# Patient Record
Sex: FEMALE | Race: WHITE | NOT HISPANIC OR LATINO
[De-identification: names, ages, dates, MRNs, and addresses within clinical notes are randomized per-mention and may not be internally consistent; named-entity substitution may affect disease eponyms.]

---

## 2019-04-12 PROBLEM — Z00.00 ENCOUNTER FOR PREVENTIVE HEALTH EXAMINATION: Status: ACTIVE | Noted: 2019-04-12

## 2019-05-27 ENCOUNTER — TRANSCRIPTION ENCOUNTER (OUTPATIENT)
Age: 79
End: 2019-05-27

## 2019-08-07 ENCOUNTER — INPATIENT (INPATIENT)
Facility: HOSPITAL | Age: 79
LOS: 5 days | Discharge: NURSING, REHAB OR LONG-TERM CARE | End: 2019-08-13
Attending: ORTHOPAEDIC SURGERY | Admitting: ORTHOPAEDIC SURGERY
Payer: MEDICARE

## 2019-08-07 VITALS
SYSTOLIC BLOOD PRESSURE: 188 MMHG | HEART RATE: 67 BPM | TEMPERATURE: 98 F | OXYGEN SATURATION: 94 % | RESPIRATION RATE: 18 BRPM | DIASTOLIC BLOOD PRESSURE: 84 MMHG

## 2019-08-07 DIAGNOSIS — Z98.890 OTHER SPECIFIED POSTPROCEDURAL STATES: Chronic | ICD-10-CM

## 2019-08-07 LAB
ALBUMIN SERPL ELPH-MCNC: 4 G/DL — SIGNIFICANT CHANGE UP (ref 3.5–5.2)
ALP SERPL-CCNC: 59 U/L — SIGNIFICANT CHANGE UP (ref 30–115)
ALT FLD-CCNC: 12 U/L — SIGNIFICANT CHANGE UP (ref 0–41)
ANION GAP SERPL CALC-SCNC: 13 MMOL/L — SIGNIFICANT CHANGE UP (ref 7–14)
APTT BLD: 25.5 SEC — LOW (ref 27–39.2)
AST SERPL-CCNC: 18 U/L — SIGNIFICANT CHANGE UP (ref 0–41)
BASOPHILS # BLD AUTO: 0.04 K/UL — SIGNIFICANT CHANGE UP (ref 0–0.2)
BASOPHILS NFR BLD AUTO: 0.4 % — SIGNIFICANT CHANGE UP (ref 0–1)
BILIRUB SERPL-MCNC: 0.3 MG/DL — SIGNIFICANT CHANGE UP (ref 0.2–1.2)
BUN SERPL-MCNC: 28 MG/DL — HIGH (ref 10–20)
CALCIUM SERPL-MCNC: 9.5 MG/DL — SIGNIFICANT CHANGE UP (ref 8.5–10.1)
CHLORIDE SERPL-SCNC: 103 MMOL/L — SIGNIFICANT CHANGE UP (ref 98–110)
CO2 SERPL-SCNC: 24 MMOL/L — SIGNIFICANT CHANGE UP (ref 17–32)
CREAT SERPL-MCNC: 0.9 MG/DL — SIGNIFICANT CHANGE UP (ref 0.7–1.5)
EOSINOPHIL # BLD AUTO: 0.23 K/UL — SIGNIFICANT CHANGE UP (ref 0–0.7)
EOSINOPHIL NFR BLD AUTO: 2.1 % — SIGNIFICANT CHANGE UP (ref 0–8)
GLUCOSE SERPL-MCNC: 105 MG/DL — HIGH (ref 70–99)
HCT VFR BLD CALC: 39.9 % — SIGNIFICANT CHANGE UP (ref 37–47)
HGB BLD-MCNC: 13.6 G/DL — SIGNIFICANT CHANGE UP (ref 12–16)
IMM GRANULOCYTES NFR BLD AUTO: 0.5 % — HIGH (ref 0.1–0.3)
INR BLD: 0.88 RATIO — SIGNIFICANT CHANGE UP (ref 0.65–1.3)
LYMPHOCYTES # BLD AUTO: 2.41 K/UL — SIGNIFICANT CHANGE UP (ref 1.2–3.4)
LYMPHOCYTES # BLD AUTO: 21.5 % — SIGNIFICANT CHANGE UP (ref 20.5–51.1)
MCHC RBC-ENTMCNC: 30.6 PG — SIGNIFICANT CHANGE UP (ref 27–31)
MCHC RBC-ENTMCNC: 34.1 G/DL — SIGNIFICANT CHANGE UP (ref 32–37)
MCV RBC AUTO: 89.9 FL — SIGNIFICANT CHANGE UP (ref 81–99)
MONOCYTES # BLD AUTO: 0.85 K/UL — HIGH (ref 0.1–0.6)
MONOCYTES NFR BLD AUTO: 7.6 % — SIGNIFICANT CHANGE UP (ref 1.7–9.3)
NEUTROPHILS # BLD AUTO: 7.6 K/UL — HIGH (ref 1.4–6.5)
NEUTROPHILS NFR BLD AUTO: 67.9 % — SIGNIFICANT CHANGE UP (ref 42.2–75.2)
NRBC # BLD: 0 /100 WBCS — SIGNIFICANT CHANGE UP (ref 0–0)
PLATELET # BLD AUTO: 242 K/UL — SIGNIFICANT CHANGE UP (ref 130–400)
POTASSIUM SERPL-MCNC: 4.7 MMOL/L — SIGNIFICANT CHANGE UP (ref 3.5–5)
POTASSIUM SERPL-SCNC: 4.7 MMOL/L — SIGNIFICANT CHANGE UP (ref 3.5–5)
PROT SERPL-MCNC: 6.8 G/DL — SIGNIFICANT CHANGE UP (ref 6–8)
PROTHROM AB SERPL-ACNC: 10.1 SEC — SIGNIFICANT CHANGE UP (ref 9.95–12.87)
RBC # BLD: 4.44 M/UL — SIGNIFICANT CHANGE UP (ref 4.2–5.4)
RBC # FLD: 12.4 % — SIGNIFICANT CHANGE UP (ref 11.5–14.5)
SODIUM SERPL-SCNC: 140 MMOL/L — SIGNIFICANT CHANGE UP (ref 135–146)
WBC # BLD: 11.19 K/UL — HIGH (ref 4.8–10.8)
WBC # FLD AUTO: 11.19 K/UL — HIGH (ref 4.8–10.8)

## 2019-08-07 PROCEDURE — 73502 X-RAY EXAM HIP UNI 2-3 VIEWS: CPT | Mod: 26,RT

## 2019-08-07 PROCEDURE — 73552 X-RAY EXAM OF FEMUR 2/>: CPT | Mod: 26,RT

## 2019-08-07 PROCEDURE — 99223 1ST HOSP IP/OBS HIGH 75: CPT

## 2019-08-07 PROCEDURE — 71045 X-RAY EXAM CHEST 1 VIEW: CPT | Mod: 26

## 2019-08-07 PROCEDURE — 12001 RPR S/N/AX/GEN/TRNK 2.5CM/<: CPT

## 2019-08-07 PROCEDURE — 71260 CT THORAX DX C+: CPT | Mod: 26

## 2019-08-07 PROCEDURE — 99222 1ST HOSP IP/OBS MODERATE 55: CPT

## 2019-08-07 PROCEDURE — 72125 CT NECK SPINE W/O DYE: CPT | Mod: 26

## 2019-08-07 PROCEDURE — 93010 ELECTROCARDIOGRAM REPORT: CPT

## 2019-08-07 PROCEDURE — 73560 X-RAY EXAM OF KNEE 1 OR 2: CPT | Mod: 26,RT

## 2019-08-07 PROCEDURE — 70450 CT HEAD/BRAIN W/O DYE: CPT | Mod: 26

## 2019-08-07 PROCEDURE — 99285 EMERGENCY DEPT VISIT HI MDM: CPT | Mod: 25

## 2019-08-07 PROCEDURE — 74177 CT ABD & PELVIS W/CONTRAST: CPT | Mod: 26

## 2019-08-07 RX ORDER — HYDROCHLOROTHIAZIDE 25 MG
25 TABLET ORAL DAILY
Refills: 0 | Status: DISCONTINUED | OUTPATIENT
Start: 2019-08-07 | End: 2019-08-09

## 2019-08-07 RX ORDER — ACETAMINOPHEN 500 MG
650 TABLET ORAL EVERY 6 HOURS
Refills: 0 | Status: DISCONTINUED | OUTPATIENT
Start: 2019-08-07 | End: 2019-08-09

## 2019-08-07 RX ORDER — ATORVASTATIN CALCIUM 80 MG/1
40 TABLET, FILM COATED ORAL AT BEDTIME
Refills: 0 | Status: DISCONTINUED | OUTPATIENT
Start: 2019-08-07 | End: 2019-08-09

## 2019-08-07 RX ORDER — MORPHINE SULFATE 50 MG/1
4 CAPSULE, EXTENDED RELEASE ORAL ONCE
Refills: 0 | Status: DISCONTINUED | OUTPATIENT
Start: 2019-08-07 | End: 2019-08-07

## 2019-08-07 RX ORDER — ONDANSETRON 8 MG/1
4 TABLET, FILM COATED ORAL EVERY 6 HOURS
Refills: 0 | Status: DISCONTINUED | OUTPATIENT
Start: 2019-08-07 | End: 2019-08-09

## 2019-08-07 RX ORDER — HEPARIN SODIUM 5000 [USP'U]/ML
5000 INJECTION INTRAVENOUS; SUBCUTANEOUS EVERY 8 HOURS
Refills: 0 | Status: DISCONTINUED | OUTPATIENT
Start: 2019-08-07 | End: 2019-08-09

## 2019-08-07 RX ORDER — LISINOPRIL 2.5 MG/1
1 TABLET ORAL
Qty: 0 | Refills: 0 | DISCHARGE

## 2019-08-07 RX ORDER — ASPIRIN/CALCIUM CARB/MAGNESIUM 324 MG
81 TABLET ORAL DAILY
Refills: 0 | Status: DISCONTINUED | OUTPATIENT
Start: 2019-08-07 | End: 2019-08-09

## 2019-08-07 RX ORDER — ROSUVASTATIN CALCIUM 5 MG/1
1 TABLET ORAL
Qty: 0 | Refills: 0 | DISCHARGE

## 2019-08-07 RX ORDER — MORPHINE SULFATE 50 MG/1
2 CAPSULE, EXTENDED RELEASE ORAL EVERY 6 HOURS
Refills: 0 | Status: DISCONTINUED | OUTPATIENT
Start: 2019-08-07 | End: 2019-08-09

## 2019-08-07 RX ORDER — MORPHINE SULFATE 50 MG/1
2 CAPSULE, EXTENDED RELEASE ORAL ONCE
Refills: 0 | Status: DISCONTINUED | OUTPATIENT
Start: 2019-08-07 | End: 2019-08-07

## 2019-08-07 RX ORDER — CHOLECALCIFEROL (VITAMIN D3) 125 MCG
1000 CAPSULE ORAL DAILY
Refills: 0 | Status: DISCONTINUED | OUTPATIENT
Start: 2019-08-07 | End: 2019-08-09

## 2019-08-07 RX ORDER — SODIUM CHLORIDE 9 MG/ML
1000 INJECTION INTRAMUSCULAR; INTRAVENOUS; SUBCUTANEOUS ONCE
Refills: 0 | Status: COMPLETED | OUTPATIENT
Start: 2019-08-07 | End: 2019-08-07

## 2019-08-07 RX ORDER — LISINOPRIL 2.5 MG/1
40 TABLET ORAL DAILY
Refills: 0 | Status: DISCONTINUED | OUTPATIENT
Start: 2019-08-07 | End: 2019-08-09

## 2019-08-07 RX ORDER — PREGABALIN 225 MG/1
1000 CAPSULE ORAL DAILY
Refills: 0 | Status: DISCONTINUED | OUTPATIENT
Start: 2019-08-07 | End: 2019-08-09

## 2019-08-07 RX ORDER — OMEGA-3 ACID ETHYL ESTERS 1 G
1 CAPSULE ORAL
Qty: 0 | Refills: 0 | DISCHARGE

## 2019-08-07 RX ADMIN — SODIUM CHLORIDE 1000 MILLILITER(S): 9 INJECTION INTRAMUSCULAR; INTRAVENOUS; SUBCUTANEOUS at 19:41

## 2019-08-07 RX ADMIN — MORPHINE SULFATE 4 MILLIGRAM(S): 50 CAPSULE, EXTENDED RELEASE ORAL at 19:41

## 2019-08-07 NOTE — CONSULT NOTE ADULT - SUBJECTIVE AND OBJECTIVE BOX
ORTHOPAEDIC SURGERY CONSULT    79F with hx of R TEE in 2010 by our group in [facility]. Sustained a ground level fall today while fixing her TV which led to immediate pain and swelling to her R thigh. Denies pain anywhere else. Denies anteceding pain in that anatomic location.    PAST MEDICAL & SURGICAL HISTORY:  High cholesterol  HTN (hypertension)    Allergies    No Known Allergies    Home Medications:  Per chart      PE:    RLE  Comfortable in bed, NAD  No obvious deformity around the thigh  Knee in slight external rotation  No skin breaks or tenting  Motor exam limited 2/2 fracture but EHL/FHL/TA intact  SILT distally  WWP        XR: R Periprosthetic femur fracture, TEE in place, stem appears to be well fixed.        A/P: 79F as above  - Please obtain CT R full femur   - NWB RLE  - Patient to be admitted to Trauma vs. Medicine.   - Discussed with Dr. Gillian Coto, possible OR this week. Will confirm after CT.

## 2019-08-07 NOTE — H&P ADULT - HISTORY OF PRESENT ILLNESS
79 year old female with PMH HTN, HLD, and PSH of right hip replacement presents with fall with right hip pain, positve head trauma with laceration, negative LOC, on daily baby aspirin. Per ED, patient states she was kneeling on floor doing something with her TV and leaned backwards and lost her balance hitting head on coffee table. Patient denies any dizziness, N/V or visual complaints. Patient states she dragged her body to phone because she was unable to ambulate due to pain at hip. No CP, SOB, palpitations, neck, back or abdominal pain. No focal weakness or paresthesias. 79 year old female with PMH HTN, HLD, and PSH of right hip replacement presents s/p fall fall with right thigh pain, +ve HT on ASA with posterior laceration, negative LOC, Patient states she was kneeling on floor doing and leaned backwards and lost her balance hitting head on coffee table. Patient denies any dizziness, N/V or visual complaints. Patient states she dragged her body to phone because she was unable to ambulate due to pain at hip. No CP, SOB, palpitations, neck, back or abdominal pain. No focal weakness or paresthesias.

## 2019-08-07 NOTE — ED PROVIDER NOTE - OBJECTIVE STATEMENT
78 yo female with PMH HTN, HLD, hx right hip replacement presents c/p fall with right hip pain and laceration to head. Pt states she was kneeling on floor doing something with her TV and leaned backwards and ost her balance hitting head on coffee table. Pt denies any LOC, HA, dizziness, N/V or visual complaints. Pt states she dragged her body to phone because she was unable to ambulate due to pain at hip. No CP, SOB, palpitations, neck, back or abdominal pain. No focal weakness or paresthesias. Pt on 81 mg aspirin daily.

## 2019-08-07 NOTE — ED ADULT NURSE NOTE - OBJECTIVE STATEMENT
Patient presents to ER s/p fall in her living room, pt states she was bending down and fell backwards, hit her head on coffee table, denies any loc, denies use of blood thinners, +laceration to occipital head, bleeding controlled at this time, pt complains of right thigh pain, long board applied by ems. pt denies any pain at this time, increased pain upon movement. Pt alert and orientedx3, iv inserted. Safety maintained and hourly rounding performed. Will continue with plan of care.

## 2019-08-07 NOTE — H&P ADULT - NSHPLABSRESULTS_GEN_ALL_CORE
< from: Xray Hip 2-3 Views, Right (08.07.19 @ 18:55) >    Findings/  IMPRESSION:    Status post total right hip arthroplasty with acetabular screw fixation.    No evidence of acute fracture or dislocation.     Degenerative changes to the left hip joints and visible portions of the   lumbar spine.    Vascular calcifications.          ***Please see the addendum at the top of this report. It may contain   additional important information or changes.****      AKASH FITZGERALD M.D., RESIDENT RADIOLOGIST    < from: CT Head No Cont (08.07.19 @ 19:12) >    IMPRESSION:   1.  No evidence of acute intracranial hemorrhage, midline shift or mass   effect.  2.  Partial opacification of the right maxillary sinus with an air-fluid   level, consistent with sinusitis versus hemorrhage.              RAHAT ALEXIS M.D., RESIDENT RADIOLOGIST  This document has been electronically signed.  CLARE YO M.D., ATTENDING RADIOLOGIST  This document has been electronically signed. Aug  7 2019  8:22PM      This document has been electronically signed.  CLARE YO M.D., ATTENDING RADIOLOGIST  This document has been electronically signed. Aug  7 2019  8:24PM  Addend:  CLARE YO M.D., ATTENDING RADIOLOGIST  This addendum was electronically signed on: Aug  7 2019  9:10PM.        < from: CT Abdomen and Pelvis w/ IV Cont (08.07.19 @ 19:24) >      IMPRESSION:    No CT evidence of acute trauma thoracic or abdominal pelvic traumatic   injury.    Cholelithiasis and sludge.              AKASH FITZGERALD M.D., RESIDENT RADIOLOGIST  This document has been electronically signed.  CLARE YO M.D., ATTENDING RADIOLOGIST  This document has been electronically signed. Aug  7 2019  8:13PM        < end of copied text > Labs:  CAPILLARY BLOOD GLUCOSE                              13.6   11.19 )-----------( 242      ( 07 Aug 2019 17:40 )             39.9       Auto Neutrophil %: 67.9 % (08-07-19 @ 17:40)  Auto Immature Granulocyte %: 0.5 % (08-07-19 @ 17:40)    08-07    140  |  103  |  28<H>  ----------------------------<  105<H>  4.7   |  24  |  0.9      Calcium, Total Serum: 9.5 mg/dL (08-07-19 @ 17:40)      LFTs:             6.8  | 0.3  | 18       ------------------[59      ( 07 Aug 2019 17:40 )  4.0  | x    | 12          Lipase:x      Amylase:x             Coags:     10.10  ----< 0.88    ( 07 Aug 2019 17:40 )     25.5        < from: CT Abdomen and Pelvis w/ IV Cont (08.07.19 @ 19:24) >    No CT evidence of acute trauma thoracic or abdominal pelvic traumatic   injury.    Cholelithiasis and sludge.    < end of copied text >    < from: CT Cervical Spine No Cont (08.07.19 @ 19:13) >    No acute osseous abnormality    Multilevel moderatedegenerative disc disease    < end of copied text >    < from: CT Head No Cont (08.07.19 @ 19:12) >    1.  No evidence of acute intracranial hemorrhage, midline shift or mass   effect.  2.  Partial opacification of the right maxillary sinus with an air-fluid   level, consistent with sinusitis versus hemorrhage.    < end of copied text >    < from: Xray Hip 2-3 Views, Right (08.07.19 @ 18:55) >      Correction: Patient status post right total hip replacement with   displaced periprosthetic fracture at the tip of the femoral stem.   Surrounding soft tissue swelling is consistent with hematoma.    < end of copied text >

## 2019-08-07 NOTE — ED PROVIDER NOTE - CARE PLAN
Principal Discharge DX:	Closed fracture of right femur, unspecified fracture morphology, unspecified portion of femur, initial encounter  Secondary Diagnosis:	Scalp laceration  Secondary Diagnosis:	Fall

## 2019-08-07 NOTE — H&P ADULT - ATTENDING COMMENTS
mid femor shaft fracture   for OR with ortho     pain control   cleared from trauma to undergo femor repair

## 2019-08-07 NOTE — ED PROVIDER NOTE - CLINICAL SUMMARY MEDICAL DECISION MAKING FREE TEXT BOX
pt s/p fall, diagnosed with right femur fracture, pan scan without additional acute injuries, seen by trauma, ortho consulted, requested additional CT LE right and trauma requesting admission to Dr. Jung service

## 2019-08-07 NOTE — H&P ADULT - NSHPPHYSICALEXAM_GEN_ALL_CORE
T(C): 36.6 (07 Aug 2019 16:10), Max: 36.6 (07 Aug 2019 16:10)  T(F): 97.9 (07 Aug 2019 16:10), Max: 97.9 (07 Aug 2019 16:10)  HR: 68 (07 Aug 2019 17:20) (67 - 68)  BP: 181/78 (07 Aug 2019 17:20) (181/78 - 188/84)  RR: 18 (07 Aug 2019 17:20) (18 - 18)  SpO2: 98% (07 Aug 2019 17:20) (94% - 98%)    PHYSICAL EXAM:  GENERAL: NAD, well-appearing  CHEST/LUNG: Clear to auscultation bilaterally  HEART: Regular rate and rhythm  ABDOMEN: Soft, Nontender, Nondistended;   EXTREMITIES:  tenderness right thigh, no ecchymoses, unable to range due to pain, shortened and externally rotated, no ankle tenderness or calf tenderness

## 2019-08-07 NOTE — H&P ADULT - ASSESSMENT
Assessment:  79 year old female who presented with fall with hip pain and head trauma.     Plan:  - pain control as needed  - recs as per ortho  - f/u CT R full femur  - possible OR  - keep NPO Assessment:  79 year old female who presented with fall with hip pain and head trauma found to have right femur fx    Plan:  - Admit to surgical service  - pain control as needed  - F/u ortho recs s/p CT R full femur  - possible OR later this week (friday preliminary)  - Regular Diet  - PT/PMR consult  - Hospitalist consult  - Resume home medications    Plan discussed with Dr. Jung

## 2019-08-07 NOTE — ED PROVIDER NOTE - PHYSICAL EXAMINATION
VITAL SIGNS: noted  CONSTITUTIONAL: Well-developed; well-nourished; in no acute distress  HEAD: Normocephalic; 1 cm superficial laceration to posterior occiput, no active bleeding  EYES: PERRL, EOM intact; conjunctiva and sclera clear  ENT: No nasal discharge; airway clear. MMM  NECK: Supple; non tender. No anterior cervical lymphadenopathy noted  CARD: S1, S2 normal; no murmurs, gallops, or rubs. Regular rate and rhythm  RESP: CTAB/L, no wheezes, rales or rhonchi  ABD: Normal bowel sounds; soft; non-distended; non-tender; no hepatosplenomegaly. No CVA tenderness  EXT: + mild tenderness right thigh, no ecchymoses, unable to range due to pain, shortened and externally rotated, no ankle tenderness or calf tenderness.  Distal pulses equal and intact  NEURO: Alert, oriented. Grossly unremarkable. No focal deficits  SKIN: Skin exam is warm and dry, no acute rash  MS: No midline spinal tenderness

## 2019-08-08 LAB
ANION GAP SERPL CALC-SCNC: 12 MMOL/L — SIGNIFICANT CHANGE UP (ref 7–14)
APPEARANCE UR: ABNORMAL
BACTERIA # UR AUTO: ABNORMAL
BASOPHILS # BLD AUTO: 0.02 K/UL — SIGNIFICANT CHANGE UP (ref 0–0.2)
BASOPHILS # BLD AUTO: 0.02 K/UL — SIGNIFICANT CHANGE UP (ref 0–0.2)
BASOPHILS NFR BLD AUTO: 0.2 % — SIGNIFICANT CHANGE UP (ref 0–1)
BASOPHILS NFR BLD AUTO: 0.2 % — SIGNIFICANT CHANGE UP (ref 0–1)
BILIRUB UR-MCNC: NEGATIVE — SIGNIFICANT CHANGE UP
BLD GP AB SCN SERPL QL: SIGNIFICANT CHANGE UP
BUN SERPL-MCNC: 25 MG/DL — HIGH (ref 10–20)
CALCIUM SERPL-MCNC: 9.4 MG/DL — SIGNIFICANT CHANGE UP (ref 8.5–10.1)
CHLORIDE SERPL-SCNC: 101 MMOL/L — SIGNIFICANT CHANGE UP (ref 98–110)
CO2 SERPL-SCNC: 28 MMOL/L — SIGNIFICANT CHANGE UP (ref 17–32)
COLOR SPEC: YELLOW — SIGNIFICANT CHANGE UP
CREAT SERPL-MCNC: 0.8 MG/DL — SIGNIFICANT CHANGE UP (ref 0.7–1.5)
DIFF PNL FLD: ABNORMAL
EOSINOPHIL # BLD AUTO: 0.02 K/UL — SIGNIFICANT CHANGE UP (ref 0–0.7)
EOSINOPHIL # BLD AUTO: 0.09 K/UL — SIGNIFICANT CHANGE UP (ref 0–0.7)
EOSINOPHIL NFR BLD AUTO: 0.2 % — SIGNIFICANT CHANGE UP (ref 0–8)
EOSINOPHIL NFR BLD AUTO: 0.8 % — SIGNIFICANT CHANGE UP (ref 0–8)
EPI CELLS # UR: 2 /HPF — SIGNIFICANT CHANGE UP (ref 0–5)
GLUCOSE SERPL-MCNC: 147 MG/DL — HIGH (ref 70–99)
GLUCOSE UR QL: NEGATIVE — SIGNIFICANT CHANGE UP
HCT VFR BLD CALC: 34.3 % — LOW (ref 37–47)
HCT VFR BLD CALC: 34.7 % — LOW (ref 37–47)
HGB BLD-MCNC: 11.4 G/DL — LOW (ref 12–16)
HGB BLD-MCNC: 11.7 G/DL — LOW (ref 12–16)
HYALINE CASTS # UR AUTO: 1 /LPF — SIGNIFICANT CHANGE UP (ref 0–7)
IMM GRANULOCYTES NFR BLD AUTO: 0.4 % — HIGH (ref 0.1–0.3)
IMM GRANULOCYTES NFR BLD AUTO: 0.5 % — HIGH (ref 0.1–0.3)
KETONES UR-MCNC: SIGNIFICANT CHANGE UP
LEUKOCYTE ESTERASE UR-ACNC: NEGATIVE — SIGNIFICANT CHANGE UP
LYMPHOCYTES # BLD AUTO: 1.44 K/UL — SIGNIFICANT CHANGE UP (ref 1.2–3.4)
LYMPHOCYTES # BLD AUTO: 1.64 K/UL — SIGNIFICANT CHANGE UP (ref 1.2–3.4)
LYMPHOCYTES # BLD AUTO: 15.3 % — LOW (ref 20.5–51.1)
LYMPHOCYTES # BLD AUTO: 16.9 % — LOW (ref 20.5–51.1)
MAGNESIUM SERPL-MCNC: 2 MG/DL — SIGNIFICANT CHANGE UP (ref 1.8–2.4)
MCHC RBC-ENTMCNC: 29.5 PG — SIGNIFICANT CHANGE UP (ref 27–31)
MCHC RBC-ENTMCNC: 30.2 PG — SIGNIFICANT CHANGE UP (ref 27–31)
MCHC RBC-ENTMCNC: 33.2 G/DL — SIGNIFICANT CHANGE UP (ref 32–37)
MCHC RBC-ENTMCNC: 33.7 G/DL — SIGNIFICANT CHANGE UP (ref 32–37)
MCV RBC AUTO: 88.6 FL — SIGNIFICANT CHANGE UP (ref 81–99)
MCV RBC AUTO: 89.4 FL — SIGNIFICANT CHANGE UP (ref 81–99)
MONOCYTES # BLD AUTO: 0.89 K/UL — HIGH (ref 0.1–0.6)
MONOCYTES # BLD AUTO: 1.27 K/UL — HIGH (ref 0.1–0.6)
MONOCYTES NFR BLD AUTO: 10.4 % — HIGH (ref 1.7–9.3)
MONOCYTES NFR BLD AUTO: 11.8 % — HIGH (ref 1.7–9.3)
NEUTROPHILS # BLD AUTO: 6.12 K/UL — SIGNIFICANT CHANGE UP (ref 1.4–6.5)
NEUTROPHILS # BLD AUTO: 7.66 K/UL — HIGH (ref 1.4–6.5)
NEUTROPHILS NFR BLD AUTO: 71.4 % — SIGNIFICANT CHANGE UP (ref 42.2–75.2)
NEUTROPHILS NFR BLD AUTO: 71.9 % — SIGNIFICANT CHANGE UP (ref 42.2–75.2)
NITRITE UR-MCNC: NEGATIVE — SIGNIFICANT CHANGE UP
NRBC # BLD: 0 /100 WBCS — SIGNIFICANT CHANGE UP (ref 0–0)
NRBC # BLD: 0 /100 WBCS — SIGNIFICANT CHANGE UP (ref 0–0)
PH UR: 6 — SIGNIFICANT CHANGE UP (ref 5–8)
PHOSPHATE SERPL-MCNC: 4.1 MG/DL — SIGNIFICANT CHANGE UP (ref 2.1–4.9)
PLATELET # BLD AUTO: 205 K/UL — SIGNIFICANT CHANGE UP (ref 130–400)
PLATELET # BLD AUTO: 228 K/UL — SIGNIFICANT CHANGE UP (ref 130–400)
POTASSIUM SERPL-MCNC: 4.1 MMOL/L — SIGNIFICANT CHANGE UP (ref 3.5–5)
POTASSIUM SERPL-SCNC: 4.1 MMOL/L — SIGNIFICANT CHANGE UP (ref 3.5–5)
PROT UR-MCNC: SIGNIFICANT CHANGE UP
RBC # BLD: 3.87 M/UL — LOW (ref 4.2–5.4)
RBC # BLD: 3.88 M/UL — LOW (ref 4.2–5.4)
RBC # FLD: 12.5 % — SIGNIFICANT CHANGE UP (ref 11.5–14.5)
RBC # FLD: 12.6 % — SIGNIFICANT CHANGE UP (ref 11.5–14.5)
RBC CASTS # UR COMP ASSIST: 91 /HPF — HIGH (ref 0–4)
SODIUM SERPL-SCNC: 141 MMOL/L — SIGNIFICANT CHANGE UP (ref 135–146)
SP GR SPEC: >1.05 (ref 1.01–1.02)
UROBILINOGEN FLD QL: SIGNIFICANT CHANGE UP
WBC # BLD: 10.73 K/UL — SIGNIFICANT CHANGE UP (ref 4.8–10.8)
WBC # BLD: 8.52 K/UL — SIGNIFICANT CHANGE UP (ref 4.8–10.8)
WBC # FLD AUTO: 10.73 K/UL — SIGNIFICANT CHANGE UP (ref 4.8–10.8)
WBC # FLD AUTO: 8.52 K/UL — SIGNIFICANT CHANGE UP (ref 4.8–10.8)
WBC UR QL: 9 /HPF — HIGH (ref 0–5)

## 2019-08-08 PROCEDURE — 99233 SBSQ HOSP IP/OBS HIGH 50: CPT

## 2019-08-08 PROCEDURE — 71045 X-RAY EXAM CHEST 1 VIEW: CPT | Mod: 26

## 2019-08-08 PROCEDURE — 73700 CT LOWER EXTREMITY W/O DYE: CPT | Mod: 26,RT

## 2019-08-08 PROCEDURE — 93010 ELECTROCARDIOGRAM REPORT: CPT

## 2019-08-08 RX ORDER — SODIUM CHLORIDE 9 MG/ML
1000 INJECTION INTRAMUSCULAR; INTRAVENOUS; SUBCUTANEOUS
Refills: 0 | Status: DISCONTINUED | OUTPATIENT
Start: 2019-08-08 | End: 2019-08-09

## 2019-08-08 RX ORDER — CHLORHEXIDINE GLUCONATE 213 G/1000ML
1 SOLUTION TOPICAL
Refills: 0 | Status: DISCONTINUED | OUTPATIENT
Start: 2019-08-08 | End: 2019-08-09

## 2019-08-08 RX ADMIN — MORPHINE SULFATE 2 MILLIGRAM(S): 50 CAPSULE, EXTENDED RELEASE ORAL at 00:29

## 2019-08-08 RX ADMIN — MORPHINE SULFATE 2 MILLIGRAM(S): 50 CAPSULE, EXTENDED RELEASE ORAL at 21:57

## 2019-08-08 RX ADMIN — Medication 25 MILLIGRAM(S): at 05:13

## 2019-08-08 RX ADMIN — Medication 1000 UNIT(S): at 12:07

## 2019-08-08 RX ADMIN — MORPHINE SULFATE 2 MILLIGRAM(S): 50 CAPSULE, EXTENDED RELEASE ORAL at 21:37

## 2019-08-08 RX ADMIN — HEPARIN SODIUM 5000 UNIT(S): 5000 INJECTION INTRAVENOUS; SUBCUTANEOUS at 13:14

## 2019-08-08 RX ADMIN — HEPARIN SODIUM 5000 UNIT(S): 5000 INJECTION INTRAVENOUS; SUBCUTANEOUS at 21:38

## 2019-08-08 RX ADMIN — ATORVASTATIN CALCIUM 40 MILLIGRAM(S): 80 TABLET, FILM COATED ORAL at 21:38

## 2019-08-08 RX ADMIN — MORPHINE SULFATE 2 MILLIGRAM(S): 50 CAPSULE, EXTENDED RELEASE ORAL at 12:09

## 2019-08-08 RX ADMIN — MORPHINE SULFATE 2 MILLIGRAM(S): 50 CAPSULE, EXTENDED RELEASE ORAL at 04:57

## 2019-08-08 RX ADMIN — Medication 650 MILLIGRAM(S): at 05:13

## 2019-08-08 RX ADMIN — HEPARIN SODIUM 5000 UNIT(S): 5000 INJECTION INTRAVENOUS; SUBCUTANEOUS at 05:13

## 2019-08-08 RX ADMIN — LISINOPRIL 40 MILLIGRAM(S): 2.5 TABLET ORAL at 05:13

## 2019-08-08 RX ADMIN — Medication 650 MILLIGRAM(S): at 18:15

## 2019-08-08 RX ADMIN — Medication 81 MILLIGRAM(S): at 12:09

## 2019-08-08 RX ADMIN — PREGABALIN 1000 MICROGRAM(S): 225 CAPSULE ORAL at 12:08

## 2019-08-08 RX ADMIN — MORPHINE SULFATE 2 MILLIGRAM(S): 50 CAPSULE, EXTENDED RELEASE ORAL at 12:30

## 2019-08-08 NOTE — CONSULT NOTE ADULT - ASSESSMENT
79 year old female with PMH HTN, HLD, and PSH of right hip replacement presents s/p fall, found to have right spiral femur fracture     IMPRESSION:  HTN  DLD  Hx of heavy smoking    Recommendations:  - Patient is a Class I risk as per RCRI scoring--> she is a low risk patient for moderate risk procedure  - Per mehta score, patient has 0.2% of developing a chey-op MI or cardiac arrest extending up to 30 days post op( negligible risk)  - No further intervention needed from cardiology standpoint  - Awaiting attending's final recs 79 year old female with PMH HTN, HLD, and PSH of right hip replacement presents s/p fall, found to have right spiral femur fracture.     IMPRESSION:  HTN  DLD  Hx of heavy smoking    Recommendations:  - Patient is a Class I risk as per RCRI scoring--> she is a low risk patient for moderate risk procedure  - Per mehta score, patient has 0.2% of developing a chey-op MI or cardiac arrest extending up to 30 days post op( negligible risk)  - No further intervention needed from cardiology standpoint  - Awaiting attending's final recs 79 year old female with PMH HTN, HLD, and PSH of right hip replacement presents s/p fall, found to have right spiral femur fracture.     IMPRESSION:  HTN  DLD  Hx of heavy smoking    Recommendations:  - Patient is a intermediate risk patient for high risk procedure and had been stabilized as per her cardiac status  - No further intervention needed from cardiology standpoint

## 2019-08-08 NOTE — PROGRESS NOTE ADULT - SUBJECTIVE AND OBJECTIVE BOX
GENERAL SURGERY PROGRESS NOTE     NOEL QUEZADA  Female  Hospital day :1d  POD:  Procedure:   OVERNIGHT EVENTS: no acute events     T(F): 96 (08-08-19 @ 07:47), Max: 97.9 (08-07-19 @ 16:10)  HR: 65 (08-08-19 @ 07:47) (65 - 87)  BP: 122/62 (08-08-19 @ 07:47) (122/62 - 188/84)  ABP: --  ABP(mean): --  RR: 18 (08-08-19 @ 07:47) (18 - 18)  SpO2: 96% (08-08-19 @ 07:47) (94% - 98%)    DIET/FLUIDS: cholecalciferol 1000 Unit(s) Oral daily  cyanocobalamin 1000 MICROGram(s) Oral daily    NG:                                                                              DRAINS:   BM:   EMESIS:   URINE:    GI proph:    AC/ proph: aspirin  chewable 81 milliGRAM(s) Oral daily  heparin  Injectable 5000 Unit(s) SubCutaneous every 8 hours    ABx:     PHYSICAL EXAM:  GENERAL: NAD, well-appearing  CHEST/LUNG: Clear to auscultation bilaterally  HEART: Regular rate and rhythm  ABDOMEN: Soft, Nontender, Nondistended;   EXTREMITIES:  No clubbing, cyanosis, or edema      LABS  Labs:  CAPILLARY BLOOD GLUCOSE                              11.4   8.52  )-----------( 228      ( 08 Aug 2019 06:48 )             34.3       Auto Neutrophil %: 71.9 % (08-08-19 @ 06:48)  Auto Immature Granulocyte %: 0.4 % (08-08-19 @ 06:48)  Auto Neutrophil %: 67.9 % (08-07-19 @ 17:40)  Auto Immature Granulocyte %: 0.5 % (08-07-19 @ 17:40)    08-08    141  |  101  |  25<H>  ----------------------------<  147<H>  4.1   |  28  |  0.8      Calcium, Total Serum: 9.4 mg/dL (08-08-19 @ 06:48)      LFTs:             6.8  | 0.3  | 18       ------------------[59      ( 07 Aug 2019 17:40 )  4.0  | x    | 12          Lipase:x      Amylase:x             Coags:     10.10  ----< 0.88    ( 07 Aug 2019 17:40 )     25.5                Urinalysis Basic - ( 07 Aug 2019 17:42 )    Color: Yellow / Appearance: Slightly Turbid / SG: >1.050 / pH: x  Gluc: x / Ketone: Trace  / Bili: Negative / Urobili: <2 mg/dL   Blood: x / Protein: Trace / Nitrite: Negative   Leuk Esterase: Negative / RBC: 91 /HPF / WBC 9 /HPF   Sq Epi: x / Non Sq Epi: 2 /HPF / Bacteria: Moderate            RADIOLOGY & ADDITIONAL TESTS:    < from: Xray Hip 2-3 Views, Right (08.07.19 @ 18:55) >  Findings/  IMPRESSION:    Status post total right hip arthroplasty with acetabular screw fixation.    No evidence of acute fracture or dislocation.     Degenerative changes to the left hip joints and visible portions of the   lumbar spine.    Vascular calcifications.    < end of copied text >

## 2019-08-08 NOTE — INPATIENT CERTIFICATION FOR MEDICARE PATIENTS - RISKS OF ADVERSE EVENTS
Concern for cardiopulmonary deterioration/Concern for neurologic deterioration/Concern for delay in diagnosis and treatment/Concern for renal deterioration/Concern for worsening infectious process

## 2019-08-08 NOTE — CONSULT NOTE ADULT - SUBJECTIVE AND OBJECTIVE BOX
T(C): 35.6 (08-08-19 @ 07:47), Max: 36.6 (08-07-19 @ 16:10)  HR: 65 (08-08-19 @ 07:47) (65 - 87)  BP: 122/62 (08-08-19 @ 07:47) (122/62 - 188/84)  RR: 18 (08-08-19 @ 07:47) (18 - 18)  SpO2: 96% (08-08-19 @ 07:47) (94% - 98%)    MOTOR EXAM      Physical Medicine And Rehabilitation Services are not indicated in this patient for the following Reason(s):    [    ] Patient is medically unstable       [  x  ]  Patient does not have appropriate activity orders - on bedrest      [     ] Patient has no weight bearing status for:      [     ] Patient is independently ambulating      [     ]  Patient is from Skilled Nursing Facility and is appropriate to return      [     ] Patient was non-ambulatory prior to admission      [  x   ]  Other - awaiting for surgery for femur fx, reconsult post-op / ortho clearance      WILL CANCEL PM&R / PT request

## 2019-08-08 NOTE — CONSULT NOTE ADULT - SUBJECTIVE AND OBJECTIVE BOX
HPI:  79 year old female with PMH HTN, HLD, and PSH of right hip replacement presents s/p fall fall with right thigh pain, +ve HT on ASA with posterior laceration, negative LOC, Patient states she was kneeling on floor doing and leaned backwards and lost her balance hitting head on coffee table. Patient denies any dizziness, N/V or visual complaints. Patient states she dragged her body to phone because she was unable to ambulate due to pain at hip. No CP, SOB, palpitations, neck, back or abdominal pain. No focal weakness or paresthesias. (07 Aug 2019 21:56)    Cardiology history:  Patient has PMHx of HTN on lisinopril and HCTZ and DLD on rosuvastatin, compliant to meds. She is also on aspirin 81 mg qd for primary ppx. She follows up with Dr. Richards and does yearly echo and EKG. She mentions that she has insignificant "plaques" on coronaries per her cardiologist. She ambulates without assistance and does her groceries by herself and lifts grocery bags by herself. She uses cane and walker intermittently and the limitation to her activity is knee and leg pain that she attributes to osteoarthritis.   She is a former smoker, quit 2 years ago, has been smoking for 60 years. No hx of chest pain, palpitations, sob or stroke.  She tolerated well her previous right total hip replacement with no chey-op complications.      PAST MEDICAL & SURGICAL HISTORY  High cholesterol  HTN (hypertension)  History of arthroplasty of right hip      FAMILY HISTORY:  FAMILY HISTORY:      SOCIAL HISTORY:  []smoker  []Alcohol  []Drug    ALLERGIES:  No Known Allergies      MEDICATIONS:  MEDICATIONS  (STANDING):  acetaminophen   Tablet .. 650 milliGRAM(s) Oral every 6 hours  aspirin  chewable 81 milliGRAM(s) Oral daily  atorvastatin 40 milliGRAM(s) Oral at bedtime  chlorhexidine 4% Liquid 1 Application(s) Topical <User Schedule>  cholecalciferol 1000 Unit(s) Oral daily  cyanocobalamin 1000 MICROGram(s) Oral daily  heparin  Injectable 5000 Unit(s) SubCutaneous every 8 hours  hydrochlorothiazide 25 milliGRAM(s) Oral daily  lisinopril 40 milliGRAM(s) Oral daily    MEDICATIONS  (PRN):  morphine  - Injectable 2 milliGRAM(s) IV Push every 6 hours PRN Severe Pain (7 - 10)  ondansetron Injectable 4 milliGRAM(s) IV Push every 6 hours PRN Nausea      HOME MEDICATIONS:  Home Medications:  aspirin 81 mg oral tablet: 1 tab(s) orally once a day (07 Aug 2019 22:17)  Fish Oil 1000 mg oral capsule: 1 cap(s) orally once a day (07 Aug 2019 22:17)  hydroCHLOROthiazide 25 mg oral tablet: 1 tab(s) orally once a day (07 Aug 2019 22:17)  lisinopril 40 mg oral tablet: 1 tab(s) orally 2 times a day (07 Aug 2019 22:17)  rosuvastatin 10 mg oral tablet: 1 tab(s) orally once a day (07 Aug 2019 22:17)  Vitamin B-12 1000 mcg oral tablet: 1 tab(s) orally once a day (07 Aug 2019 22:17)  Vitamin D3 1000 intl units oral tablet: 1 tab(s) orally once a day (07 Aug 2019 22:17)      VITALS:   T(F): 96 (08-08 @ 07:47), Max: 97.9 (08-07 @ 16:10)  HR: 65 (08-08 @ 07:47) (65 - 87)  BP: 122/62 (08-08 @ 07:47) (122/62 - 188/84)  BP(mean): --  RR: 18 (08-08 @ 07:47) (18 - 18)  SpO2: 96% (08-08 @ 07:47) (94% - 98%)    I&O's Summary      REVIEW OF SYSTEMS:  CONSTITUTIONAL: No weakness, fevers or chills  EYES: No visual changes  ENT: No vertigo or throat pain   NECK: No pain or stiffness  RESPIRATORY: No cough, wheezing, hemoptysis; No shortness of breath  CARDIOVASCULAR: No chest pain or palpitations  GASTROINTESTINAL: No abdominal or epigastric pain. No nausea, vomiting, or hematemesis; No diarrhea or constipation. No melena or hematochezia.  GENITOURINARY: No dysuria, frequency or hematuria  NEUROLOGICAL: No numbness or weakness  SKIN: No itching, no rashes  MSK: no    PHYSICAL EXAM:  NEURO: patient is awake , alert and oriented  GEN: Not in acute distress, NC/AT, anicteric  NECK: no thyroid enlargement, no JVD  LUNGS: Clear to auscultation bilaterally, no wheezing, no rhonchi  CARDIOVASCULAR: S1/S2 present, RRR , no murmurs or rubs, no carotid bruits,  + PP bilaterally  ABD: Soft, non-tender, non-distended, +BS  EXT: Shortened Right LE,externally rotated and adducted. Unable to move RLE from pain.    SKIN: Intact    LABS:                        11.4   8.52  )-----------( 228      ( 08 Aug 2019 06:48 )             34.3     08-08    141  |  101  |  25<H>  ----------------------------<  147<H>  4.1   |  28  |  0.8    Ca    9.4      08 Aug 2019 06:48  Phos  4.1     08-08  Mg     2.0     08-08    TPro  6.8  /  Alb  4.0  /  TBili  0.3  /  DBili  x   /  AST  18  /  ALT  12  /  AlkPhos  59  08-07    PT/INR - ( 07 Aug 2019 17:40 )   PT: 10.10 sec;   INR: 0.88 ratio         PTT - ( 07 Aug 2019 17:40 )  PTT:25.5 sec          Troponin trend:      RADIOLOGY:  -CXR:  -TTE:  -CCTA:  -STRESS TEST:  -CATHETERIZATION:    ECG: NSR    TELEMETRY EVENTS: Patient not on telemetry HPI:  79 year old female with PMH HTN, HLD, and PSH of right hip replacement presents s/p fall fall with right thigh pain, +ve HT on ASA with posterior laceration, negative LOC, Patient states she was kneeling on floor doing and leaned backwards and lost her balance hitting head on coffee table. Patient denies any dizziness, N/V or visual complaints. Patient states she dragged her body to phone because she was unable to ambulate due to pain at hip. No CP, SOB, palpitations, neck, back or abdominal pain. No focal weakness or paresthesias. (07 Aug 2019 21:56)    Cardiology history:  Patient has PMHx of HTN on lisinopril and HCTZ and DLD on rosuvastatin, compliant to meds. She is also on aspirin 81 mg qd for primary ppx. She follows up with Dr. Richards and does yearly echo and EKG. She mentions that she has insignificant "plaques" on coronaries per her cardiologist. She ambulates without assistance and does her groceries by herself and lifts grocery bags by herself. She uses cane and walker intermittently and the limitation to her activity is knee and leg pain that she attributes to osteoarthritis.   She is a former smoker, quit 2 years ago, has been smoking for 60 years. No hx of chest pain, palpitations, sob or stroke.  She tolerated well her previous right total hip replacement with no chey-op complications.      PAST MEDICAL & SURGICAL HISTORY  High cholesterol  HTN (hypertension)  History of arthroplasty of right hip      FAMILY HISTORY:  FAMILY HISTORY:      SOCIAL HISTORY:  []smoker  []Alcohol  []Drug    ALLERGIES:  No Known Allergies      MEDICATIONS:  MEDICATIONS  (STANDING):  acetaminophen   Tablet .. 650 milliGRAM(s) Oral every 6 hours  aspirin  chewable 81 milliGRAM(s) Oral daily  atorvastatin 40 milliGRAM(s) Oral at bedtime  chlorhexidine 4% Liquid 1 Application(s) Topical <User Schedule>  cholecalciferol 1000 Unit(s) Oral daily  cyanocobalamin 1000 MICROGram(s) Oral daily  heparin  Injectable 5000 Unit(s) SubCutaneous every 8 hours  hydrochlorothiazide 25 milliGRAM(s) Oral daily  lisinopril 40 milliGRAM(s) Oral daily    MEDICATIONS  (PRN):  morphine  - Injectable 2 milliGRAM(s) IV Push every 6 hours PRN Severe Pain (7 - 10)  ondansetron Injectable 4 milliGRAM(s) IV Push every 6 hours PRN Nausea      HOME MEDICATIONS:  Home Medications:  aspirin 81 mg oral tablet: 1 tab(s) orally once a day (07 Aug 2019 22:17)  Fish Oil 1000 mg oral capsule: 1 cap(s) orally once a day (07 Aug 2019 22:17)  hydroCHLOROthiazide 25 mg oral tablet: 1 tab(s) orally once a day (07 Aug 2019 22:17)  lisinopril 40 mg oral tablet: 1 tab(s) orally 2 times a day (07 Aug 2019 22:17)  rosuvastatin 10 mg oral tablet: 1 tab(s) orally once a day (07 Aug 2019 22:17)  Vitamin B-12 1000 mcg oral tablet: 1 tab(s) orally once a day (07 Aug 2019 22:17)  Vitamin D3 1000 intl units oral tablet: 1 tab(s) orally once a day (07 Aug 2019 22:17)      VITALS:   T(F): 96 (08-08 @ 07:47), Max: 97.9 (08-07 @ 16:10)  HR: 65 (08-08 @ 07:47) (65 - 87)  BP: 122/62 (08-08 @ 07:47) (122/62 - 188/84)  BP(mean): --  RR: 18 (08-08 @ 07:47) (18 - 18)  SpO2: 96% (08-08 @ 07:47) (94% - 98%)    I&O's Summary      REVIEW OF SYSTEMS:  CONSTITUTIONAL: No weakness, fevers or chills  EYES: No visual changes  ENT: No vertigo or throat pain   NECK: No pain or stiffness  RESPIRATORY: No cough, wheezing, hemoptysis; No shortness of breath  CARDIOVASCULAR: No chest pain or palpitations  GASTROINTESTINAL: No abdominal or epigastric pain. No nausea, vomiting, or hematemesis; No diarrhea or constipation. No melena or hematochezia.  GENITOURINARY: No dysuria, frequency or hematuria  NEUROLOGICAL: No numbness or weakness  SKIN: No itching, no rashes  MSK: no    PHYSICAL EXAM:  NEURO: patient is awake , alert and oriented  GEN: Not in acute distress, NC/AT, anicteric  NECK: no thyroid enlargement, no JVD  LUNGS: Clear to auscultation bilaterally, no wheezing, no rhonchi  CARDIOVASCULAR: S1/S2 present, RRR , no murmurs or rubs, no carotid bruits,  + PP bilaterally  ABD: Soft, non-tender, non-distended, +BS  EXT: Shortened Right LE,externally rotated and adducted. Unable to move RLE from pain.    SKIN: Intact    LABS:                        11.4   8.52  )-----------( 228      ( 08 Aug 2019 06:48 )             34.3     08-08    141  |  101  |  25<H>  ----------------------------<  147<H>  4.1   |  28  |  0.8    Ca    9.4      08 Aug 2019 06:48  Phos  4.1     08-08  Mg     2.0     08-08    TPro  6.8  /  Alb  4.0  /  TBili  0.3  /  DBili  x   /  AST  18  /  ALT  12  /  AlkPhos  59  08-07    PT/INR - ( 07 Aug 2019 17:40 )   PT: 10.10 sec;   INR: 0.88 ratio         PTT - ( 07 Aug 2019 17:40 )  PTT:25.5 sec          Troponin trend:      RADIOLOGY:    -TTE: From OMNI: mild LVH. Normal LVSF. Mild AR, MR, TR.    -STRESS TEST: 2011  1. IV Dipyridamole Dual Isotope Study which was negative with respect to symptoms and EKG changes.   2. Myocardial perfusion imaging reveals no fixed no reperfusion defects   3. Gated imaging reveals normal wall motion wall thickening ejection fraction   Recommendation:   Medical therapy.     ECG: NSR    TELEMETRY EVENTS: Patient not on telemetry

## 2019-08-08 NOTE — PROGRESS NOTE ADULT - ASSESSMENT
78 y/o woman s/p fall --> chey prosthetic right femur fx    Plan:   - f/u ortho   - cardiology clearance   - medicine clearance   - trend hb

## 2019-08-09 LAB
ANION GAP SERPL CALC-SCNC: 12 MMOL/L — SIGNIFICANT CHANGE UP (ref 7–14)
APTT BLD: 29 SEC — SIGNIFICANT CHANGE UP (ref 27–39.2)
BASOPHILS # BLD AUTO: 0.03 K/UL — SIGNIFICANT CHANGE UP (ref 0–0.2)
BASOPHILS NFR BLD AUTO: 0.3 % — SIGNIFICANT CHANGE UP (ref 0–1)
BUN SERPL-MCNC: 25 MG/DL — HIGH (ref 10–20)
CALCIUM SERPL-MCNC: 9.1 MG/DL — SIGNIFICANT CHANGE UP (ref 8.5–10.1)
CHLORIDE SERPL-SCNC: 98 MMOL/L — SIGNIFICANT CHANGE UP (ref 98–110)
CO2 SERPL-SCNC: 26 MMOL/L — SIGNIFICANT CHANGE UP (ref 17–32)
CREAT SERPL-MCNC: 0.7 MG/DL — SIGNIFICANT CHANGE UP (ref 0.7–1.5)
EOSINOPHIL # BLD AUTO: 0.08 K/UL — SIGNIFICANT CHANGE UP (ref 0–0.7)
EOSINOPHIL NFR BLD AUTO: 0.9 % — SIGNIFICANT CHANGE UP (ref 0–8)
GLUCOSE SERPL-MCNC: 149 MG/DL — HIGH (ref 70–99)
HCT VFR BLD CALC: 31.6 % — LOW (ref 37–47)
HCT VFR BLD CALC: 32 % — LOW (ref 37–47)
HGB BLD-MCNC: 10.5 G/DL — LOW (ref 12–16)
HGB BLD-MCNC: 10.8 G/DL — LOW (ref 12–16)
IMM GRANULOCYTES NFR BLD AUTO: 0.2 % — SIGNIFICANT CHANGE UP (ref 0.1–0.3)
INR BLD: 1.03 RATIO — SIGNIFICANT CHANGE UP (ref 0.65–1.3)
LYMPHOCYTES # BLD AUTO: 1.62 K/UL — SIGNIFICANT CHANGE UP (ref 1.2–3.4)
LYMPHOCYTES # BLD AUTO: 17.9 % — LOW (ref 20.5–51.1)
MAGNESIUM SERPL-MCNC: 1.9 MG/DL — SIGNIFICANT CHANGE UP (ref 1.8–2.4)
MCHC RBC-ENTMCNC: 29.6 PG — SIGNIFICANT CHANGE UP (ref 27–31)
MCHC RBC-ENTMCNC: 30 PG — SIGNIFICANT CHANGE UP (ref 27–31)
MCHC RBC-ENTMCNC: 33.2 G/DL — SIGNIFICANT CHANGE UP (ref 32–37)
MCHC RBC-ENTMCNC: 33.8 G/DL — SIGNIFICANT CHANGE UP (ref 32–37)
MCV RBC AUTO: 88.9 FL — SIGNIFICANT CHANGE UP (ref 81–99)
MCV RBC AUTO: 89 FL — SIGNIFICANT CHANGE UP (ref 81–99)
MONOCYTES # BLD AUTO: 1.04 K/UL — HIGH (ref 0.1–0.6)
MONOCYTES NFR BLD AUTO: 11.5 % — HIGH (ref 1.7–9.3)
NEUTROPHILS # BLD AUTO: 6.25 K/UL — SIGNIFICANT CHANGE UP (ref 1.4–6.5)
NEUTROPHILS NFR BLD AUTO: 69.2 % — SIGNIFICANT CHANGE UP (ref 42.2–75.2)
NRBC # BLD: 0 /100 WBCS — SIGNIFICANT CHANGE UP (ref 0–0)
NRBC # BLD: 0 /100 WBCS — SIGNIFICANT CHANGE UP (ref 0–0)
PHOSPHATE SERPL-MCNC: 3.6 MG/DL — SIGNIFICANT CHANGE UP (ref 2.1–4.9)
PLATELET # BLD AUTO: 189 K/UL — SIGNIFICANT CHANGE UP (ref 130–400)
PLATELET # BLD AUTO: 193 K/UL — SIGNIFICANT CHANGE UP (ref 130–400)
POTASSIUM SERPL-MCNC: 3.7 MMOL/L — SIGNIFICANT CHANGE UP (ref 3.5–5)
POTASSIUM SERPL-SCNC: 3.7 MMOL/L — SIGNIFICANT CHANGE UP (ref 3.5–5)
PROTHROM AB SERPL-ACNC: 11.8 SEC — SIGNIFICANT CHANGE UP (ref 9.95–12.87)
RBC # BLD: 3.55 M/UL — LOW (ref 4.2–5.4)
RBC # BLD: 3.6 M/UL — LOW (ref 4.2–5.4)
RBC # FLD: 12.5 % — SIGNIFICANT CHANGE UP (ref 11.5–14.5)
RBC # FLD: 12.5 % — SIGNIFICANT CHANGE UP (ref 11.5–14.5)
SODIUM SERPL-SCNC: 136 MMOL/L — SIGNIFICANT CHANGE UP (ref 135–146)
WBC # BLD: 10.03 K/UL — SIGNIFICANT CHANGE UP (ref 4.8–10.8)
WBC # BLD: 9.04 K/UL — SIGNIFICANT CHANGE UP (ref 4.8–10.8)
WBC # FLD AUTO: 10.03 K/UL — SIGNIFICANT CHANGE UP (ref 4.8–10.8)
WBC # FLD AUTO: 9.04 K/UL — SIGNIFICANT CHANGE UP (ref 4.8–10.8)

## 2019-08-09 PROCEDURE — 99233 SBSQ HOSP IP/OBS HIGH 50: CPT

## 2019-08-09 RX ORDER — POTASSIUM CHLORIDE 20 MEQ
20 PACKET (EA) ORAL ONCE
Refills: 0 | Status: COMPLETED | OUTPATIENT
Start: 2019-08-09 | End: 2019-08-09

## 2019-08-09 RX ADMIN — Medication 650 MILLIGRAM(S): at 01:07

## 2019-08-09 RX ADMIN — MORPHINE SULFATE 2 MILLIGRAM(S): 50 CAPSULE, EXTENDED RELEASE ORAL at 16:17

## 2019-08-09 RX ADMIN — PREGABALIN 1000 MICROGRAM(S): 225 CAPSULE ORAL at 13:55

## 2019-08-09 RX ADMIN — Medication 81 MILLIGRAM(S): at 13:52

## 2019-08-09 RX ADMIN — LISINOPRIL 40 MILLIGRAM(S): 2.5 TABLET ORAL at 05:59

## 2019-08-09 RX ADMIN — HEPARIN SODIUM 5000 UNIT(S): 5000 INJECTION INTRAVENOUS; SUBCUTANEOUS at 13:56

## 2019-08-09 RX ADMIN — Medication 1000 UNIT(S): at 14:01

## 2019-08-09 RX ADMIN — SODIUM CHLORIDE 100 MILLILITER(S): 9 INJECTION INTRAMUSCULAR; INTRAVENOUS; SUBCUTANEOUS at 01:08

## 2019-08-09 RX ADMIN — Medication 50 MILLIEQUIVALENT(S): at 05:58

## 2019-08-09 RX ADMIN — Medication 650 MILLIGRAM(S): at 14:03

## 2019-08-09 RX ADMIN — HEPARIN SODIUM 5000 UNIT(S): 5000 INJECTION INTRAVENOUS; SUBCUTANEOUS at 05:58

## 2019-08-09 RX ADMIN — Medication 25 MILLIGRAM(S): at 05:59

## 2019-08-09 RX ADMIN — Medication 650 MILLIGRAM(S): at 13:51

## 2019-08-09 RX ADMIN — CHLORHEXIDINE GLUCONATE 1 APPLICATION(S): 213 SOLUTION TOPICAL at 05:58

## 2019-08-09 RX ADMIN — Medication 650 MILLIGRAM(S): at 05:58

## 2019-08-09 RX ADMIN — Medication 650 MILLIGRAM(S): at 05:59

## 2019-08-09 NOTE — PROGRESS NOTE ADULT - SUBJECTIVE AND OBJECTIVE BOX
NOEL QUEZADA [id number]    Hospitalist Note  80yo F with Past Medical History HTN, Hypercholesteremia, and prior right hip replacement admitted status post fall complicated by right hip fracture.  The patient states that she was attempting to reset her cable box, when she fell backwards onto to her right side.  She was unable to bear weight, but was able to reach her phone.  She denied loss of consciousness, chest pain, or palpitations.    Overnight events/Updates: No new complaints.  Hospitalist Service was consulted for pre-operative evaluation.    Vital Signs Last 24 Hrs  T(C): 36.1 (09 Aug 2019 07:33), Max: 36.3 (08 Aug 2019 19:54)  T(F): 96.9 (09 Aug 2019 07:33), Max: 97.4 (08 Aug 2019 19:54)  HR: 88 (09 Aug 2019 07:33) (64 - 88)  BP: 112/63 (09 Aug 2019 07:33) (112/63 - 131/72)  BP(mean): --  RR: 18 (09 Aug 2019 07:33) (16 - 18)  SpO2: --    Physical Examination:  General: AAO x 3  HEENT: PERRLA, EOMI  CV= S1 & S2 appreciated, no murmurs  Lungs= CTA BL  Abdominal Examination= + BS, Obese, Soft, NT/ND  Extremity Examination= limited motion of the RLE    ROS: No chest pain, no shortness of breath.  All other systems reviewed and are within normal limits except for the complaints in the HPI.    MEDICATIONS  (STANDING):  acetaminophen   Tablet .. 650 milliGRAM(s) Oral every 6 hours  aspirin  chewable 81 milliGRAM(s) Oral daily  atorvastatin 40 milliGRAM(s) Oral at bedtime  chlorhexidine 4% Liquid 1 Application(s) Topical <User Schedule>  cholecalciferol 1000 Unit(s) Oral daily  cyanocobalamin 1000 MICROGram(s) Oral daily  heparin  Injectable 5000 Unit(s) SubCutaneous every 8 hours  hydrochlorothiazide 25 milliGRAM(s) Oral daily  lisinopril 40 milliGRAM(s) Oral daily  sodium chloride 0.9%. 1000 milliLiter(s) (100 mL/Hr) IV Continuous <Continuous>    MEDICATIONS  (PRN):  morphine  - Injectable 2 milliGRAM(s) IV Push every 6 hours PRN Severe Pain (7 - 10)  ondansetron Injectable 4 milliGRAM(s) IV Push every 6 hours PRN Nausea                            10.8   10.03 )-----------( 193      ( 09 Aug 2019 05:26 )             32.0     08-09    136  |  98  |  25<H>  ----------------------------<  149<H>  3.7   |  26  |  0.7    Ca    9.1      09 Aug 2019 01:06  Phos  3.6     08-09  Mg     1.9     08-09    TPro  6.8  /  Alb  4.0  /  TBili  0.3  /  DBili  x   /  AST  18  /  ALT  12  /  AlkPhos  59  08-07      Case discussed with housestaff & family  JONO Bhatia [id number]

## 2019-08-09 NOTE — CHART NOTE - NSCHARTNOTEFT_GEN_A_CORE
Pre-op Clearance    NOEL QUEZADA  79yFemale  CLOSED FRACTURE OF RIGHT FEMUR;SCALP LACERTION;FALL    Planned Procedure: ORIF periprosthetic femur fracture      Labs:  Labs:  CAPILLARY BLOOD GLUCOSE                          10.5   9.04  )-----------( 189      ( 09 Aug 2019 01:06 )             31.6       Auto Immature Granulocyte %: 0.2 % (19 @ 01:06)  Auto Neutrophil %: 69.2 % (19 @ 01:06)  Auto Neutrophil %: 71.4 % (19 @ 16:51)  Auto Immature Granulocyte %: 0.5 % (19 @ 16:51)  Auto Neutrophil %: 71.9 % (19 @ 06:48)  Auto Immature Granulocyte %: 0.4 % (19 @ 06:48)        136  |  98  |  25<H>  ----------------------------<  149<H>  3.7   |  26  |  0.7      Calcium, Total Serum: 9.1 mg/dL (19 @ 01:06)      LFTs:             6.8  | 0.3  | 18       ------------------[59      ( 07 Aug 2019 17:40 )  4.0  | x    | 12              Coags:     11.80  ----< 1.03    ( 09 Aug 2019 01:06 )     29.0          Urinalysis Basic - ( 07 Aug 2019 17:42 )    Color: Yellow / Appearance: Slightly Turbid / SG: >1.050 / pH: x  Gluc: x / Ketone: Trace  / Bili: Negative / Urobili: <2 mg/dL   Blood: x / Protein: Trace / Nitrite: Negative   Leuk Esterase: Negative / RBC: 91 /HPF / WBC 9 /HPF   Sq Epi: x / Non Sq Epi: 2 /HPF / Bacteria: Moderate    ABO RH Interpretation: A POS (19 @ 06:48)      Imaging Studies:  EC Lead ECG:   Ventricular Rate 66 BPM    Atrial Rate 66 BPM    P-R Interval 160 ms    QRS Duration 86 ms    Q-T Interval 408 ms    QTC Calculation(Bezet) 427 ms    P Axis 43 degrees    R Axis 71 degrees    T Axis 32 degrees    Diagnosis Line *** Poor data quality, interpretation may be adversely affected  Normal sinus rhythm  Cannot rule out Anterior infarct , age undetermined  Abnormal ECG    Confirmed by Jese Benz ([id number]) on 2019 1:30:15 PM (19 @ 10:28)  12 Lead ECG:   Ventricular Rate 68 BPM    Atrial Rate 68 BPM    P-R Interval 152 ms    QRS Duration 82 ms    Q-T Interval 404 ms    QTC Calculation(Bezet) 429 ms    P Axis 37 degrees    R Axis 18 degrees    T Axis 43 degrees    Diagnosis Line Normal sinus rhythm  Normal ECG    Confirmed by Gregory Mchugh ([id number]) on 2019 8:36:25 PM (19 @ 17:56)      CXR:     < from: Xray Chest 1 View AP/PA (19 @ 00:45) >    Impression:    No radiographic evidence of acute cardiopulmonary disease.    Blood Products Needed: 4u on hold  Current Diet Order: NPO

## 2019-08-09 NOTE — PROGRESS NOTE ADULT - ASSESSMENT
80yo F with Past Medical History HTN, Hypercholesteremia, and prior right hip replacement admitted status post fall complicated by right chey-prosthetic hip fracture.    Fall complicated by right chey-prosthetic hip fracture: keep NWB to the RLE.  The patient is scheduled to undergo surgery later today (follow-up with orthopedics team).  EKG revealed an incomplete RBBB without significant ST changes.  Repeat CBC daily due to expected acute blood loss anemia with periprosthetic fracture.  She is moderate risk for surgical complications (RCRI=1 for high risk surgery, periprosthetic repair).  Continue Morphine for pain control and switch to Oxycodone post-op.   Hypertension: blood pressure stable prior to OR.  Continue Lisinopril.  Discontinue HCTZ, and decrease IVF to NS @ 75cc/hour.  Hypercholesteremia: continue ASA and Lipitor 40mg QHS  GI/DVT prophylaxis  case discussed with orthopedics team

## 2019-08-09 NOTE — PROGRESS NOTE ADULT - SUBJECTIVE AND OBJECTIVE BOX
GENERAL SURGERY PROGRESS NOTE     NOEL QUEZADA  Female  Hospital day :2d  POD:  Procedure:   OVERNIGHT EVENTS: No acute events.     T(F): 96.9 (08-09-19 @ 07:33), Max: 97.4 (08-08-19 @ 19:54)  HR: 88 (08-09-19 @ 07:33) (64 - 88)  BP: 112/63 (08-09-19 @ 07:33) (112/63 - 131/72)  ABP: --  ABP(mean): --  RR: 18 (08-09-19 @ 07:33) (16 - 18)  SpO2: --    DIET/FLUIDS: cholecalciferol 1000 Unit(s) Oral daily  cyanocobalamin 1000 MICROGram(s) Oral daily  sodium chloride 0.9%. 1000 milliLiter(s) IV Continuous <Continuous>    NG:                                                                                DRAINS:     BM:     EMESIS:     URINE:      GI proph:    AC/ proph: aspirin  chewable 81 milliGRAM(s) Oral daily  heparin  Injectable 5000 Unit(s) SubCutaneous every 8 hours    ABx:     PHYSICAL EXAM:  GENERAL: NAD, well-appearing  CHEST/LUNG: Clear to auscultation bilaterally  HEART: Regular rate and rhythm  ABDOMEN: Soft, Nontender, Nondistended;   EXTREMITIES:  No clubbing, cyanosis, or edema      LABS  Labs:  CAPILLARY BLOOD GLUCOSE                              10.8   10.03 )-----------( 193      ( 09 Aug 2019 05:26 )             32.0       Auto Immature Granulocyte %: 0.2 % (08-09-19 @ 01:06)  Auto Neutrophil %: 69.2 % (08-09-19 @ 01:06)  Auto Neutrophil %: 71.4 % (08-08-19 @ 16:51)  Auto Immature Granulocyte %: 0.5 % (08-08-19 @ 16:51)    08-09    136  |  98  |  25<H>  ----------------------------<  149<H>  3.7   |  26  |  0.7      Calcium, Total Serum: 9.1 mg/dL (08-09-19 @ 01:06)      LFTs:             6.8  | 0.3  | 18       ------------------[59      ( 07 Aug 2019 17:40 )  4.0  | x    | 12          Lipase:x      Amylase:x             Coags:     11.80  ----< 1.03    ( 09 Aug 2019 01:06 )     29.0                Urinalysis Basic - ( 07 Aug 2019 17:42 )    Color: Yellow / Appearance: Slightly Turbid / SG: >1.050 / pH: x  Gluc: x / Ketone: Trace  / Bili: Negative / Urobili: <2 mg/dL   Blood: x / Protein: Trace / Nitrite: Negative   Leuk Esterase: Negative / RBC: 91 /HPF / WBC 9 /HPF   Sq Epi: x / Non Sq Epi: 2 /HPF / Bacteria: Moderate            RADIOLOGY & ADDITIONAL TESTS:      A/P

## 2019-08-10 LAB
ANION GAP SERPL CALC-SCNC: 12 MMOL/L — SIGNIFICANT CHANGE UP (ref 7–14)
ANION GAP SERPL CALC-SCNC: 16 MMOL/L — HIGH (ref 7–14)
BASOPHILS # BLD AUTO: 0.05 K/UL — SIGNIFICANT CHANGE UP (ref 0–0.2)
BASOPHILS NFR BLD AUTO: 0.2 % — SIGNIFICANT CHANGE UP (ref 0–1)
BUN SERPL-MCNC: 25 MG/DL — HIGH (ref 10–20)
BUN SERPL-MCNC: 27 MG/DL — HIGH (ref 10–20)
CALCIUM SERPL-MCNC: 8.7 MG/DL — SIGNIFICANT CHANGE UP (ref 8.5–10.1)
CALCIUM SERPL-MCNC: 8.8 MG/DL — SIGNIFICANT CHANGE UP (ref 8.5–10.1)
CHLORIDE SERPL-SCNC: 100 MMOL/L — SIGNIFICANT CHANGE UP (ref 98–110)
CHLORIDE SERPL-SCNC: 96 MMOL/L — LOW (ref 98–110)
CO2 SERPL-SCNC: 21 MMOL/L — SIGNIFICANT CHANGE UP (ref 17–32)
CO2 SERPL-SCNC: 27 MMOL/L — SIGNIFICANT CHANGE UP (ref 17–32)
CREAT SERPL-MCNC: 0.8 MG/DL — SIGNIFICANT CHANGE UP (ref 0.7–1.5)
CREAT SERPL-MCNC: 0.8 MG/DL — SIGNIFICANT CHANGE UP (ref 0.7–1.5)
EOSINOPHIL # BLD AUTO: 0.12 K/UL — SIGNIFICANT CHANGE UP (ref 0–0.7)
EOSINOPHIL NFR BLD AUTO: 0.6 % — SIGNIFICANT CHANGE UP (ref 0–8)
GLUCOSE SERPL-MCNC: 154 MG/DL — HIGH (ref 70–99)
GLUCOSE SERPL-MCNC: 212 MG/DL — HIGH (ref 70–99)
HCT VFR BLD CALC: 32.3 % — LOW (ref 37–47)
HCT VFR BLD CALC: 34.6 % — LOW (ref 37–47)
HGB BLD-MCNC: 10.7 G/DL — LOW (ref 12–16)
HGB BLD-MCNC: 11.4 G/DL — LOW (ref 12–16)
IMM GRANULOCYTES NFR BLD AUTO: 0.6 % — HIGH (ref 0.1–0.3)
LYMPHOCYTES # BLD AUTO: 10.9 % — LOW (ref 20.5–51.1)
LYMPHOCYTES # BLD AUTO: 2.19 K/UL — SIGNIFICANT CHANGE UP (ref 1.2–3.4)
MAGNESIUM SERPL-MCNC: 1.8 MG/DL — SIGNIFICANT CHANGE UP (ref 1.8–2.4)
MCHC RBC-ENTMCNC: 29.1 PG — SIGNIFICANT CHANGE UP (ref 27–31)
MCHC RBC-ENTMCNC: 29.2 PG — SIGNIFICANT CHANGE UP (ref 27–31)
MCHC RBC-ENTMCNC: 32.9 G/DL — SIGNIFICANT CHANGE UP (ref 32–37)
MCHC RBC-ENTMCNC: 33.1 G/DL — SIGNIFICANT CHANGE UP (ref 32–37)
MCV RBC AUTO: 88 FL — SIGNIFICANT CHANGE UP (ref 81–99)
MCV RBC AUTO: 88.3 FL — SIGNIFICANT CHANGE UP (ref 81–99)
MONOCYTES # BLD AUTO: 1.34 K/UL — HIGH (ref 0.1–0.6)
MONOCYTES NFR BLD AUTO: 6.6 % — SIGNIFICANT CHANGE UP (ref 1.7–9.3)
NEUTROPHILS # BLD AUTO: 16.35 K/UL — HIGH (ref 1.4–6.5)
NEUTROPHILS NFR BLD AUTO: 81.1 % — HIGH (ref 42.2–75.2)
NRBC # BLD: 0 /100 WBCS — SIGNIFICANT CHANGE UP (ref 0–0)
NRBC # BLD: 0 /100 WBCS — SIGNIFICANT CHANGE UP (ref 0–0)
PHOSPHATE SERPL-MCNC: 4.6 MG/DL — SIGNIFICANT CHANGE UP (ref 2.1–4.9)
PLATELET # BLD AUTO: 209 K/UL — SIGNIFICANT CHANGE UP (ref 130–400)
PLATELET # BLD AUTO: 217 K/UL — SIGNIFICANT CHANGE UP (ref 130–400)
POTASSIUM SERPL-MCNC: 4 MMOL/L — SIGNIFICANT CHANGE UP (ref 3.5–5)
POTASSIUM SERPL-MCNC: 4.2 MMOL/L — SIGNIFICANT CHANGE UP (ref 3.5–5)
POTASSIUM SERPL-SCNC: 4 MMOL/L — SIGNIFICANT CHANGE UP (ref 3.5–5)
POTASSIUM SERPL-SCNC: 4.2 MMOL/L — SIGNIFICANT CHANGE UP (ref 3.5–5)
RBC # BLD: 3.67 M/UL — LOW (ref 4.2–5.4)
RBC # BLD: 3.92 M/UL — LOW (ref 4.2–5.4)
RBC # FLD: 13.1 % — SIGNIFICANT CHANGE UP (ref 11.5–14.5)
RBC # FLD: 13.2 % — SIGNIFICANT CHANGE UP (ref 11.5–14.5)
SODIUM SERPL-SCNC: 135 MMOL/L — SIGNIFICANT CHANGE UP (ref 135–146)
SODIUM SERPL-SCNC: 137 MMOL/L — SIGNIFICANT CHANGE UP (ref 135–146)
WBC # BLD: 15.22 K/UL — HIGH (ref 4.8–10.8)
WBC # BLD: 20.18 K/UL — HIGH (ref 4.8–10.8)
WBC # FLD AUTO: 15.22 K/UL — HIGH (ref 4.8–10.8)
WBC # FLD AUTO: 20.18 K/UL — HIGH (ref 4.8–10.8)

## 2019-08-10 PROCEDURE — 73552 X-RAY EXAM OF FEMUR 2/>: CPT | Mod: 26,RT

## 2019-08-10 RX ORDER — OXYCODONE HYDROCHLORIDE 5 MG/1
10 TABLET ORAL EVERY 4 HOURS
Refills: 0 | Status: DISCONTINUED | OUTPATIENT
Start: 2019-08-10 | End: 2019-08-13

## 2019-08-10 RX ORDER — ONDANSETRON 8 MG/1
4 TABLET, FILM COATED ORAL ONCE
Refills: 0 | Status: COMPLETED | OUTPATIENT
Start: 2019-08-10 | End: 2019-08-10

## 2019-08-10 RX ORDER — SODIUM CHLORIDE 9 MG/ML
1000 INJECTION, SOLUTION INTRAVENOUS
Refills: 0 | Status: DISCONTINUED | OUTPATIENT
Start: 2019-08-10 | End: 2019-08-10

## 2019-08-10 RX ORDER — CHOLECALCIFEROL (VITAMIN D3) 125 MCG
1000 CAPSULE ORAL DAILY
Refills: 0 | Status: DISCONTINUED | OUTPATIENT
Start: 2019-08-10 | End: 2019-08-13

## 2019-08-10 RX ORDER — LISINOPRIL 2.5 MG/1
40 TABLET ORAL DAILY
Refills: 0 | Status: DISCONTINUED | OUTPATIENT
Start: 2019-08-10 | End: 2019-08-13

## 2019-08-10 RX ORDER — PREGABALIN 225 MG/1
1000 CAPSULE ORAL DAILY
Refills: 0 | Status: DISCONTINUED | OUTPATIENT
Start: 2019-08-10 | End: 2019-08-13

## 2019-08-10 RX ORDER — ONDANSETRON 8 MG/1
4 TABLET, FILM COATED ORAL EVERY 6 HOURS
Refills: 0 | Status: DISCONTINUED | OUTPATIENT
Start: 2019-08-10 | End: 2019-08-13

## 2019-08-10 RX ORDER — MORPHINE SULFATE 50 MG/1
4 CAPSULE, EXTENDED RELEASE ORAL EVERY 4 HOURS
Refills: 0 | Status: DISCONTINUED | OUTPATIENT
Start: 2019-08-10 | End: 2019-08-13

## 2019-08-10 RX ORDER — OXYCODONE HYDROCHLORIDE 5 MG/1
5 TABLET ORAL EVERY 4 HOURS
Refills: 0 | Status: DISCONTINUED | OUTPATIENT
Start: 2019-08-10 | End: 2019-08-13

## 2019-08-10 RX ORDER — HYDROCHLOROTHIAZIDE 25 MG
25 TABLET ORAL DAILY
Refills: 0 | Status: DISCONTINUED | OUTPATIENT
Start: 2019-08-10 | End: 2019-08-13

## 2019-08-10 RX ORDER — HEPARIN SODIUM 5000 [USP'U]/ML
5000 INJECTION INTRAVENOUS; SUBCUTANEOUS EVERY 8 HOURS
Refills: 0 | Status: DISCONTINUED | OUTPATIENT
Start: 2019-08-10 | End: 2019-08-12

## 2019-08-10 RX ORDER — SODIUM CHLORIDE 9 MG/ML
1000 INJECTION INTRAMUSCULAR; INTRAVENOUS; SUBCUTANEOUS
Refills: 0 | Status: DISCONTINUED | OUTPATIENT
Start: 2019-08-10 | End: 2019-08-11

## 2019-08-10 RX ORDER — ATORVASTATIN CALCIUM 80 MG/1
40 TABLET, FILM COATED ORAL AT BEDTIME
Refills: 0 | Status: DISCONTINUED | OUTPATIENT
Start: 2019-08-10 | End: 2019-08-13

## 2019-08-10 RX ORDER — ASPIRIN/CALCIUM CARB/MAGNESIUM 324 MG
81 TABLET ORAL DAILY
Refills: 0 | Status: DISCONTINUED | OUTPATIENT
Start: 2019-08-10 | End: 2019-08-13

## 2019-08-10 RX ORDER — HYDROMORPHONE HYDROCHLORIDE 2 MG/ML
0.5 INJECTION INTRAMUSCULAR; INTRAVENOUS; SUBCUTANEOUS
Refills: 0 | Status: DISCONTINUED | OUTPATIENT
Start: 2019-08-10 | End: 2019-08-10

## 2019-08-10 RX ORDER — ACETAMINOPHEN 500 MG
650 TABLET ORAL EVERY 6 HOURS
Refills: 0 | Status: DISCONTINUED | OUTPATIENT
Start: 2019-08-10 | End: 2019-08-13

## 2019-08-10 RX ADMIN — MORPHINE SULFATE 4 MILLIGRAM(S): 50 CAPSULE, EXTENDED RELEASE ORAL at 07:56

## 2019-08-10 RX ADMIN — SODIUM CHLORIDE 100 MILLILITER(S): 9 INJECTION INTRAMUSCULAR; INTRAVENOUS; SUBCUTANEOUS at 02:01

## 2019-08-10 RX ADMIN — OXYCODONE HYDROCHLORIDE 5 MILLIGRAM(S): 5 TABLET ORAL at 23:46

## 2019-08-10 RX ADMIN — Medication 650 MILLIGRAM(S): at 05:08

## 2019-08-10 RX ADMIN — Medication 81 MILLIGRAM(S): at 12:01

## 2019-08-10 RX ADMIN — MORPHINE SULFATE 4 MILLIGRAM(S): 50 CAPSULE, EXTENDED RELEASE ORAL at 07:40

## 2019-08-10 RX ADMIN — MORPHINE SULFATE 4 MILLIGRAM(S): 50 CAPSULE, EXTENDED RELEASE ORAL at 16:20

## 2019-08-10 RX ADMIN — Medication 650 MILLIGRAM(S): at 23:46

## 2019-08-10 RX ADMIN — ATORVASTATIN CALCIUM 40 MILLIGRAM(S): 80 TABLET, FILM COATED ORAL at 21:15

## 2019-08-10 RX ADMIN — PREGABALIN 1000 MICROGRAM(S): 225 CAPSULE ORAL at 12:02

## 2019-08-10 RX ADMIN — LISINOPRIL 40 MILLIGRAM(S): 2.5 TABLET ORAL at 05:08

## 2019-08-10 RX ADMIN — ONDANSETRON 4 MILLIGRAM(S): 8 TABLET, FILM COATED ORAL at 02:15

## 2019-08-10 RX ADMIN — HEPARIN SODIUM 5000 UNIT(S): 5000 INJECTION INTRAVENOUS; SUBCUTANEOUS at 21:15

## 2019-08-10 RX ADMIN — HEPARIN SODIUM 5000 UNIT(S): 5000 INJECTION INTRAVENOUS; SUBCUTANEOUS at 14:18

## 2019-08-10 RX ADMIN — Medication 1000 UNIT(S): at 12:01

## 2019-08-10 RX ADMIN — HEPARIN SODIUM 5000 UNIT(S): 5000 INJECTION INTRAVENOUS; SUBCUTANEOUS at 05:11

## 2019-08-10 RX ADMIN — Medication 25 MILLIGRAM(S): at 05:08

## 2019-08-10 RX ADMIN — MORPHINE SULFATE 4 MILLIGRAM(S): 50 CAPSULE, EXTENDED RELEASE ORAL at 16:34

## 2019-08-10 RX ADMIN — Medication 650 MILLIGRAM(S): at 05:11

## 2019-08-10 RX ADMIN — Medication 650 MILLIGRAM(S): at 12:01

## 2019-08-10 NOTE — CONSULT NOTE ADULT - ASSESSMENT
IMPRESSION: Rehab of 78 y/o  f  rehab  for  gd  sp  rt  femur  fx  orif    PRECAUTIONS: [  ] Cardiac  [  ] Respiratory  [  ] Seizures [  ] Contact Isolation  [  ] Droplet Isolation  [ FALL ] Other    Weight Bearing Status:     RECOMMENDATION:    Out of Bed to Chair     DVT/Decubiti Prophylaxis    REHAB PLAN:     [  xx ] Bedside P/T 3-5 times a week   [ xx  ]   Bedside O/T  2-3 times a week             [   ] No Rehab Therapy Indicated                   [   ]  Speech Therapy   Conditioning/ROM                                    ADL  Bed Mobility                                               Conditioning/ROM  Transfers                                                     Bed Mobility  Sitting /Standing Balance                         Transfers                                        Gait Training                                               Sitting/Standing Balance  Stair Training [   ]Applicable                    Home equipment Eval                                                                        Splinting  [   ] Only      GOALS:   ADL   [  x ]   Independent                    Transfers  [ x  ] Independent                          Ambulation  [  x ] Independent     [x    ] With device                            [   x]  CG                                                         [x   ]  CG                                                                  [ x  ] CG                            [    ] Min A                                                   [   ] Min A                                                              [   ] Min  A          DISCHARGE PLAN:   [ xx  ]  Good candidate for Intensive Rehabilitation/Hospital based-[facility]A [facility]                                             Will tolerate 3hrs Intensive Rehab Daily                                       [    ]  Short Term Rehab in Skilled Nursing Facility                                       [    ]  Home with Outpatient or VN services                                         [  xx  ]  Possible Candidate for Intensive Hospital based Rehab

## 2019-08-10 NOTE — BRIEF OPERATIVE NOTE - NSICDXBRIEFPOSTOP_GEN_ALL_CORE_FT
POST-OP DIAGNOSIS:  Periprosthetic fracture around internal prosthetic hip joint 10-Aug-2019 01:03:25  Hip-Howard oCto

## 2019-08-10 NOTE — PHYSICAL THERAPY INITIAL EVALUATION ADULT - GENERAL OBSERVATIONS, REHAB EVAL
PT IE pending on activity orders. pt on bed rest at this time.
Pt encountered supine in bed in NAD, Rt hip incis drsg c/d/i, c/o Rt hip pain 4/10 and agreeable to participate with PT. Pt is dependent in bed mobility; performed sitting balance at EOB. Pt returned supine in bed secondary max fatigue and unable to perform OOB act.

## 2019-08-10 NOTE — CONSULT NOTE ADULT - SUBJECTIVE AND OBJECTIVE BOX
HPI:  79 year old female with PMH HTN, HLD, and PSH of right hip replacement presents s/p fall fall with right thigh pain, +ve HT on ASA with posterior laceration, negative LOC, Patient states she was kneeling on floor doing and leaned backwards and lost her balance hitting head on coffee table. Patient denies any dizziness, N/V or visual complaints. Patient states she dragged her body to phone because she was unable to ambulate due to pain at hip. No CP, SOB, palpitations, neck, back or abdominal pain. No focal weakness or paresthesias.  ptn is  sp  rt femur fx  orif  8/9/19    PTN  REFERRED TO ACUTE  REHAB  FOR  EVAL AND  TX   PAST MEDICAL & SURGICAL HISTORY:  High cholesterol  HTN (hypertension)  History of arthroplasty of right hip      Hospital Course:    TODAY'S SUBJECTIVE & REVIEW OF SYMPTOMS:     Constitutional WNL   Cardio WNL   Resp WNL   GI WNL  Heme WNL  Endo WNL  Skin WNL  MSK WNL  Neuro WNL  Cognitive WNL  Psych WNL      MEDICATIONS  (STANDING):  acetaminophen   Tablet .. 650 milliGRAM(s) Oral every 6 hours  aspirin  chewable 81 milliGRAM(s) Oral daily  atorvastatin 40 milliGRAM(s) Oral at bedtime  cholecalciferol 1000 Unit(s) Oral daily  cyanocobalamin 1000 MICROGram(s) Oral daily  heparin  Injectable 5000 Unit(s) SubCutaneous every 8 hours  hydrochlorothiazide 25 milliGRAM(s) Oral daily  lisinopril 40 milliGRAM(s) Oral daily  sodium chloride 0.9%. 1000 milliLiter(s) (100 mL/Hr) IV Continuous <Continuous>    MEDICATIONS  (PRN):  morphine  - Injectable 4 milliGRAM(s) IV Push every 4 hours PRN Severe Pain (7 - 10)  ondansetron Injectable 4 milliGRAM(s) IV Push every 6 hours PRN Nausea  oxyCODONE    IR 10 milliGRAM(s) Oral every 4 hours PRN Moderate Pain (4 - 6)  oxyCODONE    IR 5 milliGRAM(s) Oral every 4 hours PRN Mild Pain (1 - 3)      FAMILY HISTORY:      Allergies    No Known Allergies    Intolerances        SOCIAL HISTORY:    [  ] Etoh  [  ] Smoking  [  ] Substance abuse     Home Environment:  [ x ] Home Alone  [  ] Lives with Family  [  ] Home Health Aid    Dwelling:  [ x ] Apartment  [ x] Private House  [  ] Adult Home  [  ] Skilled Nursing Facility      [  ] Short Term  [  ] Long Term  [ x ] Stairs       Elevator [  ]    FUNCTIONAL STATUS PTA: (Check all that apply)  Ambulation: [ x  ]Independent    [  ] Dependent     [  ] Non-Ambulatory  Assistive Device: [  ] SA Cane  [  ]  Q Cane  [ x ] Walker  [  ]  Wheelchair  ADL : [  x] Independent  [  ]  Dependent       Vital Signs Last 24 Hrs  T(C): 36.6 (10 Aug 2019 08:00), Max: 37.2 (10 Aug 2019 01:47)  T(F): 97.8 (10 Aug 2019 08:00), Max: 98.9 (10 Aug 2019 01:47)  HR: 91 (10 Aug 2019 08:00) (68 - 98)  BP: 110/54 (10 Aug 2019 08:00) (110/54 - 188/92)  BP(mean): --  RR: 18 (10 Aug 2019 08:00) (15 - 26)  SpO2: 100% (10 Aug 2019 08:09) (94% - 100%)      PHYSICAL EXAM: Alert & Oriented X3  GENERAL: NAD, well-groomed, well-developed  HEAD:  Atraumatic, Normocephalic  EYES: EOMI, PERRLA, conjunctiva and sclera clear  NECK: Supple, No JVD, Normal thyroid  CHEST/LUNG: Clear to percussion bilaterally; No rales, rhonchi, wheezing, or rubs  HEART: Regular rate and rhythm; No murmurs, rubs, or gallops  ABDOMEN: Soft, Nontender, Nondistended; Bowel sounds present  EXTREMITIES:  2+ Peripheral Pulses, No clubbing, cyanosis, or edema    NERVOUS SYSTEM:  Cranial Nerves 2-12 intact [ x ] Abnormal  [  ]  ROM: WFL all extremities [  ]  Abnormal [ x ]  Motor Strength: WFL all extremities  [  ]  Abnormal [ x ]  Sensation: intact to light touch [  ] Abnormal [ x]  Reflexes: Symmetric [  ]  Abnormal [ x ]    FUNCTIONAL STATUS:  Bed Mobility: Independent [  ]  Supervision [  ]  Needs Assistance [ x ]  N/A [  ]  Transfers: Independent [  ]  Supervision [  ]  Needs Assistance [  x]  N/A [  ]   Ambulation: Independent [  ]  Supervision [  ]  Needs Assistance [  x]  N/A [  ]  ADL: Independent [  ] Requires Assistance [  ] N/A [ x ]  SEE PT/OT IE NOTES    LABS:                        11.4   20.18 )-----------( 217      ( 09 Aug 2019 20:00 )             34.6     08-09    137  |  100  |  25<H>  ----------------------------<  154<H>  4.0   |  21  |  0.8    Ca    8.8      09 Aug 2019 20:00  Phos  4.6     08-09  Mg     1.8     08-09      PT/INR - ( 09 Aug 2019 01:06 )   PT: 11.80 sec;   INR: 1.03 ratio         PTT - ( 09 Aug 2019 01:06 )  PTT:29.0 sec      RADIOLOGY & ADDITIONAL STUDIES:< from: Xray Femur 2 Views, Right (08.07.19 @ 18:56) >  Impression:  Patient status post right total hip replacement with displaced   periprosthetic fracture at the tip of the femoral stem. Surrounding soft   tissue swelling is present. No additional fractures are seen. The bones   are osteopenic. There are degenerative changes of knee joint. Vascular   calcifications are noted.    < end of copied text >      Assesment:

## 2019-08-10 NOTE — PROGRESS NOTE ADULT - SUBJECTIVE AND OBJECTIVE BOX
Patient seen and examined at bedside post-operatively. Pain well controlled. No complaints at this time.     Physical exam  Vital Signs Last 24 Hrs  T(C): 36.6 (10 Aug 2019 08:00), Max: 37.2 (10 Aug 2019 01:47)  T(F): 97.8 (10 Aug 2019 08:00), Max: 98.9 (10 Aug 2019 01:47)  HR: 91 (10 Aug 2019 08:00) (68 - 98)  BP: 110/54 (10 Aug 2019 08:00) (110/54 - 188/92)  BP(mean): --  RR: 18 (10 Aug 2019 08:00) (15 - 26)  SpO2: 100% (10 Aug 2019 08:09) (94% - 100%)    NAD, AOx3  Non-labored breathing   Right hip   Dressing C/D/I  EHL/FHL/GA/TA Motor intact  SP/DP/T/S/S SILT distally  Toes WWP                          11.4   20.18 )-----------( 217      ( 09 Aug 2019 20:00 )             34.6       79F POD#0 s/p R TEE, doing well    Trend CBC   Pain control  DVT ppx  IV Abx x24hrs  NWB  OOBTC/IS  PT/OT  Continue Home meds  AM labs Patient seen and examined at bedside post-operatively. Pain well controlled. No complaints at this time.     Physical exam  Vital Signs Last 24 Hrs  T(C): 36.6 (10 Aug 2019 08:00), Max: 37.2 (10 Aug 2019 01:47)  T(F): 97.8 (10 Aug 2019 08:00), Max: 98.9 (10 Aug 2019 01:47)  HR: 91 (10 Aug 2019 08:00) (68 - 98)  BP: 110/54 (10 Aug 2019 08:00) (110/54 - 188/92)  BP(mean): --  RR: 18 (10 Aug 2019 08:00) (15 - 26)  SpO2: 100% (10 Aug 2019 08:09) (94% - 100%)    NAD, AOx3  Non-labored breathing   Right hip   Dressing C/D/I  EHL/FHL/GA/TA Motor intact  SP/DP/T/S/S SILT distally  Toes WWP                          11.4   20.18 )-----------( 217      ( 09 Aug 2019 20:00 )             34.6       79F POD#0 s/p R TEE, doing well    Trend CBC   Pain control  DVT ppx  IV Abx x24hrs  NWB  OOBTC/IS  PT/OT  Continue Home meds  AM labs   pt seen and examined   not OOB  possible 4A  check H and H

## 2019-08-10 NOTE — BRIEF OPERATIVE NOTE - NSICDXBRIEFPROCEDURE_GEN_ALL_CORE_FT
PROCEDURES:  Open reduction and internal fixation (ORIF) of fracture of right femur using plate and screws 10-Aug-2019 01:02:30  Gillian-Howard Coto

## 2019-08-10 NOTE — PHYSICAL THERAPY INITIAL EVALUATION ADULT - PERTINENT HX OF CURRENT PROBLEM, REHAB EVAL
79 year old female with PMH HTN, HLD, and PSH of right hip replacement presents s/p fall fall with right thigh pain,

## 2019-08-10 NOTE — BRIEF OPERATIVE NOTE - NSICDXBRIEFPREOP_GEN_ALL_CORE_FT
PRE-OP DIAGNOSIS:  Periprosthetic fracture around internal prosthetic hip joint 10-Aug-2019 01:03:05  Hip-Howard Coto

## 2019-08-11 LAB
HCT VFR BLD CALC: 29.5 % — LOW (ref 37–47)
HGB BLD-MCNC: 9.9 G/DL — LOW (ref 12–16)
MCHC RBC-ENTMCNC: 29.6 PG — SIGNIFICANT CHANGE UP (ref 27–31)
MCHC RBC-ENTMCNC: 33.6 G/DL — SIGNIFICANT CHANGE UP (ref 32–37)
MCV RBC AUTO: 88.3 FL — SIGNIFICANT CHANGE UP (ref 81–99)
NRBC # BLD: 0 /100 WBCS — SIGNIFICANT CHANGE UP (ref 0–0)
PLATELET # BLD AUTO: 206 K/UL — SIGNIFICANT CHANGE UP (ref 130–400)
RBC # BLD: 3.34 M/UL — LOW (ref 4.2–5.4)
RBC # FLD: 13.1 % — SIGNIFICANT CHANGE UP (ref 11.5–14.5)
WBC # BLD: 11.41 K/UL — HIGH (ref 4.8–10.8)
WBC # FLD AUTO: 11.41 K/UL — HIGH (ref 4.8–10.8)

## 2019-08-11 RX ORDER — CEFAZOLIN SODIUM 1 G
2000 VIAL (EA) INJECTION EVERY 8 HOURS
Refills: 0 | Status: DISCONTINUED | OUTPATIENT
Start: 2019-08-11 | End: 2019-08-11

## 2019-08-11 RX ORDER — CEFAZOLIN SODIUM 1 G
VIAL (EA) INJECTION
Refills: 0 | Status: COMPLETED | OUTPATIENT
Start: 2019-08-11 | End: 2019-08-12

## 2019-08-11 RX ORDER — CEFAZOLIN SODIUM 1 G
2000 VIAL (EA) INJECTION ONCE
Refills: 0 | Status: COMPLETED | OUTPATIENT
Start: 2019-08-11 | End: 2019-08-11

## 2019-08-11 RX ORDER — CEFAZOLIN SODIUM 1 G
2000 VIAL (EA) INJECTION EVERY 8 HOURS
Refills: 0 | Status: COMPLETED | OUTPATIENT
Start: 2019-08-11 | End: 2019-08-12

## 2019-08-11 RX ADMIN — HEPARIN SODIUM 5000 UNIT(S): 5000 INJECTION INTRAVENOUS; SUBCUTANEOUS at 05:26

## 2019-08-11 RX ADMIN — Medication 100 MILLIGRAM(S): at 11:28

## 2019-08-11 RX ADMIN — LISINOPRIL 40 MILLIGRAM(S): 2.5 TABLET ORAL at 05:26

## 2019-08-11 RX ADMIN — Medication 650 MILLIGRAM(S): at 17:44

## 2019-08-11 RX ADMIN — HEPARIN SODIUM 5000 UNIT(S): 5000 INJECTION INTRAVENOUS; SUBCUTANEOUS at 15:01

## 2019-08-11 RX ADMIN — Medication 650 MILLIGRAM(S): at 11:29

## 2019-08-11 RX ADMIN — Medication 25 MILLIGRAM(S): at 05:25

## 2019-08-11 RX ADMIN — ATORVASTATIN CALCIUM 40 MILLIGRAM(S): 80 TABLET, FILM COATED ORAL at 21:22

## 2019-08-11 RX ADMIN — OXYCODONE HYDROCHLORIDE 5 MILLIGRAM(S): 5 TABLET ORAL at 23:22

## 2019-08-11 RX ADMIN — OXYCODONE HYDROCHLORIDE 5 MILLIGRAM(S): 5 TABLET ORAL at 09:05

## 2019-08-11 RX ADMIN — Medication 650 MILLIGRAM(S): at 23:22

## 2019-08-11 RX ADMIN — OXYCODONE HYDROCHLORIDE 5 MILLIGRAM(S): 5 TABLET ORAL at 11:29

## 2019-08-11 RX ADMIN — PREGABALIN 1000 MICROGRAM(S): 225 CAPSULE ORAL at 11:28

## 2019-08-11 RX ADMIN — HEPARIN SODIUM 5000 UNIT(S): 5000 INJECTION INTRAVENOUS; SUBCUTANEOUS at 21:22

## 2019-08-11 RX ADMIN — Medication 1000 UNIT(S): at 11:29

## 2019-08-11 RX ADMIN — Medication 650 MILLIGRAM(S): at 17:17

## 2019-08-11 RX ADMIN — Medication 100 MILLIGRAM(S): at 18:39

## 2019-08-11 RX ADMIN — Medication 650 MILLIGRAM(S): at 05:26

## 2019-08-11 RX ADMIN — Medication 650 MILLIGRAM(S): at 11:28

## 2019-08-11 RX ADMIN — Medication 81 MILLIGRAM(S): at 11:28

## 2019-08-11 NOTE — PROGRESS NOTE ADULT - SUBJECTIVE AND OBJECTIVE BOX
Patient seen and examined at bedside post-operatively. Pain well controlled. No complaints at this time.     Vital Signs Last 24 Hrs  T(C): 36 (11 Aug 2019 07:35), Max: 36.7 (10 Aug 2019 15:41)  T(F): 96.8 (11 Aug 2019 07:35), Max: 98 (10 Aug 2019 15:41)  HR: 71 (11 Aug 2019 07:35) (71 - 86)  BP: 104/51 (11 Aug 2019 07:35) (104/51 - 144/67)  BP(mean): --  RR: 18 (11 Aug 2019 07:35) (18 - 181)  SpO2: --    NAD, AOx3  Non-labored breathing   Right hip   Dressing C/D/I  EHL/FHL/GA/TA Motor intact  SP/DP/T/S/S SILT distally  Toes WWP                                     9.9    11.41 )-----------( 206      ( 11 Aug 2019 05:08 )             29.5       79F POD#1 s/p R TEE, doing well    Trend CBC   Pain control  DVT ppx  NWB  OOBTC/IS  PT/OT  Continue Home meds  AM labs   possible 4A Patient seen and examined at bedside post-operatively. Pain well controlled. No complaints at this time.     Vital Signs Last 24 Hrs  T(C): 36 (11 Aug 2019 07:35), Max: 36.7 (10 Aug 2019 15:41)  T(F): 96.8 (11 Aug 2019 07:35), Max: 98 (10 Aug 2019 15:41)  HR: 71 (11 Aug 2019 07:35) (71 - 86)  BP: 104/51 (11 Aug 2019 07:35) (104/51 - 144/67)  BP(mean): --  RR: 18 (11 Aug 2019 07:35) (18 - 181)  SpO2: --    NAD, AOx3  Non-labored breathing   Right hip   Dressing C/D/I  EHL/FHL/GA/TA Motor intact  SP/DP/T/S/S SILT distally  Toes WWP                                     9.9    11.41 )-----------( 206      ( 11 Aug 2019 05:08 )             29.5       79F POD#1 s/p R TEE, doing well    Trend CBC   Pain control  DVT ppx  NWB  OOBTC/IS  PT/OT  Continue Home meds  AM labs   possible 4A  better today    OOB  H an H stable  4A?    NWB

## 2019-08-12 LAB
HCT VFR BLD CALC: 26.9 % — LOW (ref 37–47)
HGB BLD-MCNC: 9.1 G/DL — LOW (ref 12–16)
MCHC RBC-ENTMCNC: 30 PG — SIGNIFICANT CHANGE UP (ref 27–31)
MCHC RBC-ENTMCNC: 33.8 G/DL — SIGNIFICANT CHANGE UP (ref 32–37)
MCV RBC AUTO: 88.8 FL — SIGNIFICANT CHANGE UP (ref 81–99)
NRBC # BLD: 0 /100 WBCS — SIGNIFICANT CHANGE UP (ref 0–0)
PLATELET # BLD AUTO: 234 K/UL — SIGNIFICANT CHANGE UP (ref 130–400)
RBC # BLD: 3.03 M/UL — LOW (ref 4.2–5.4)
RBC # FLD: 13.2 % — SIGNIFICANT CHANGE UP (ref 11.5–14.5)
WBC # BLD: 9.09 K/UL — SIGNIFICANT CHANGE UP (ref 4.8–10.8)
WBC # FLD AUTO: 9.09 K/UL — SIGNIFICANT CHANGE UP (ref 4.8–10.8)

## 2019-08-12 RX ORDER — ENOXAPARIN SODIUM 100 MG/ML
40 INJECTION SUBCUTANEOUS DAILY
Refills: 0 | Status: DISCONTINUED | OUTPATIENT
Start: 2019-08-12 | End: 2019-08-13

## 2019-08-12 RX ADMIN — Medication 81 MILLIGRAM(S): at 11:44

## 2019-08-12 RX ADMIN — Medication 100 MILLIGRAM(S): at 02:39

## 2019-08-12 RX ADMIN — ENOXAPARIN SODIUM 40 MILLIGRAM(S): 100 INJECTION SUBCUTANEOUS at 11:45

## 2019-08-12 RX ADMIN — Medication 25 MILLIGRAM(S): at 05:59

## 2019-08-12 RX ADMIN — OXYCODONE HYDROCHLORIDE 5 MILLIGRAM(S): 5 TABLET ORAL at 09:49

## 2019-08-12 RX ADMIN — Medication 1000 UNIT(S): at 11:44

## 2019-08-12 RX ADMIN — PREGABALIN 1000 MICROGRAM(S): 225 CAPSULE ORAL at 11:43

## 2019-08-12 RX ADMIN — Medication 650 MILLIGRAM(S): at 05:59

## 2019-08-12 RX ADMIN — HEPARIN SODIUM 5000 UNIT(S): 5000 INJECTION INTRAVENOUS; SUBCUTANEOUS at 05:59

## 2019-08-12 RX ADMIN — Medication 650 MILLIGRAM(S): at 17:34

## 2019-08-12 RX ADMIN — OXYCODONE HYDROCHLORIDE 5 MILLIGRAM(S): 5 TABLET ORAL at 11:35

## 2019-08-12 RX ADMIN — ATORVASTATIN CALCIUM 40 MILLIGRAM(S): 80 TABLET, FILM COATED ORAL at 21:16

## 2019-08-12 RX ADMIN — Medication 650 MILLIGRAM(S): at 11:38

## 2019-08-12 RX ADMIN — Medication 650 MILLIGRAM(S): at 11:45

## 2019-08-12 RX ADMIN — Medication 650 MILLIGRAM(S): at 23:35

## 2019-08-12 RX ADMIN — OXYCODONE HYDROCHLORIDE 5 MILLIGRAM(S): 5 TABLET ORAL at 23:36

## 2019-08-12 NOTE — PROGRESS NOTE ADULT - SUBJECTIVE AND OBJECTIVE BOX
ORTHO PROGRESS NOTE       79yFemale POD # 3     S/P orif right periprosthetic femur fx     Patient seen and examined at bedside . The patient is awake and alert in NAD. No complaints of chest pain, SOB, N/V.    Vital Signs Last 24 Hrs  T(C): 36.6 (12 Aug 2019 07:34), Max: 37.2 (11 Aug 2019 15:22)  T(F): 97.8 (12 Aug 2019 07:34), Max: 99 (11 Aug 2019 15:22)  HR: 82 (12 Aug 2019 07:34) (68 - 84)  BP: 114/56 (12 Aug 2019 07:34) (74/42 - 124/54)  BP(mean): --  RR: 18 (12 Aug 2019 07:34) (18 - 19)  SpO2: 96% (11 Aug 2019 18:13) (96% - 96%)                          9.9    11.41 )-----------( 206      ( 11 Aug 2019 05:08 )             29.5     08-10    135  |  96<L>  |  27<H>  ----------------------------<  212<H>  4.2   |  27  |  0.8    Ca    8.7      10 Aug 2019 11:28            DVT ppx : lovenox     MEDICATIONS  (STANDING):  acetaminophen   Tablet .. 650 milliGRAM(s) Oral every 6 hours  aspirin  chewable 81 milliGRAM(s) Oral daily  atorvastatin 40 milliGRAM(s) Oral at bedtime  cholecalciferol 1000 Unit(s) Oral daily  cyanocobalamin 1000 MICROGram(s) Oral daily  enoxaparin Injectable 40 milliGRAM(s) SubCutaneous daily  hydrochlorothiazide 25 milliGRAM(s) Oral daily  lisinopril 40 milliGRAM(s) Oral daily    MEDICATIONS  (PRN):  morphine  - Injectable 4 milliGRAM(s) IV Push every 4 hours PRN Severe Pain (7 - 10)  ondansetron Injectable 4 milliGRAM(s) IV Push every 6 hours PRN Nausea  oxyCODONE    IR 10 milliGRAM(s) Oral every 4 hours PRN Moderate Pain (4 - 6)  oxyCODONE    IR 5 milliGRAM(s) Oral every 4 hours PRN Mild Pain (1 - 3)      PE:   right hip dressing C/D/I          Compartments soft         NVI, SILT           A/P: 79yFemale POD # 3   S/P  orif right periprosthetic fx            OOB to Chair            Physical Therapy           Pain control            Incentive Spirometry            DVT Prophylaxis            Discharge planning poss 4A

## 2019-08-13 ENCOUNTER — TRANSCRIPTION ENCOUNTER (OUTPATIENT)
Age: 79
End: 2019-08-13

## 2019-08-13 ENCOUNTER — INPATIENT (INPATIENT)
Facility: HOSPITAL | Age: 79
LOS: 15 days | Discharge: NURSING, REHAB OR LONG-TERM CARE | End: 2019-08-29
Attending: PHYSICAL MEDICINE & REHABILITATION | Admitting: PHYSICAL MEDICINE & REHABILITATION
Payer: MEDICARE

## 2019-08-13 VITALS
RESPIRATION RATE: 18 BRPM | HEART RATE: 70 BPM | DIASTOLIC BLOOD PRESSURE: 52 MMHG | TEMPERATURE: 97 F | SYSTOLIC BLOOD PRESSURE: 93 MMHG

## 2019-08-13 DIAGNOSIS — Z98.890 OTHER SPECIFIED POSTPROCEDURAL STATES: Chronic | ICD-10-CM

## 2019-08-13 PROCEDURE — 93010 ELECTROCARDIOGRAM REPORT: CPT

## 2019-08-13 RX ORDER — FERROUS SULFATE 325(65) MG
325 TABLET ORAL DAILY
Refills: 0 | Status: DISCONTINUED | OUTPATIENT
Start: 2019-08-13 | End: 2019-08-29

## 2019-08-13 RX ORDER — FOLIC ACID 0.8 MG
1 TABLET ORAL DAILY
Refills: 0 | Status: DISCONTINUED | OUTPATIENT
Start: 2019-08-13 | End: 2019-08-29

## 2019-08-13 RX ORDER — ACETAMINOPHEN 500 MG
2 TABLET ORAL
Qty: 0 | Refills: 0 | DISCHARGE
Start: 2019-08-13

## 2019-08-13 RX ORDER — ATORVASTATIN CALCIUM 80 MG/1
40 TABLET, FILM COATED ORAL AT BEDTIME
Refills: 0 | Status: DISCONTINUED | OUTPATIENT
Start: 2019-08-13 | End: 2019-08-29

## 2019-08-13 RX ORDER — PREGABALIN 225 MG/1
1000 CAPSULE ORAL DAILY
Refills: 0 | Status: DISCONTINUED | OUTPATIENT
Start: 2019-08-13 | End: 2019-08-29

## 2019-08-13 RX ORDER — ENOXAPARIN SODIUM 100 MG/ML
40 INJECTION SUBCUTANEOUS DAILY
Refills: 0 | Status: DISCONTINUED | OUTPATIENT
Start: 2019-08-13 | End: 2019-08-26

## 2019-08-13 RX ORDER — ASPIRIN/CALCIUM CARB/MAGNESIUM 324 MG
81 TABLET ORAL DAILY
Refills: 0 | Status: DISCONTINUED | OUTPATIENT
Start: 2019-08-13 | End: 2019-08-29

## 2019-08-13 RX ORDER — OXYCODONE HYDROCHLORIDE 5 MG/1
1 TABLET ORAL
Qty: 0 | Refills: 0 | DISCHARGE
Start: 2019-08-13

## 2019-08-13 RX ORDER — HYDROCHLOROTHIAZIDE 25 MG
25 TABLET ORAL DAILY
Refills: 0 | Status: DISCONTINUED | OUTPATIENT
Start: 2019-08-13 | End: 2019-08-29

## 2019-08-13 RX ORDER — OXYCODONE HYDROCHLORIDE 5 MG/1
10 TABLET ORAL EVERY 4 HOURS
Refills: 0 | Status: DISCONTINUED | OUTPATIENT
Start: 2019-08-13 | End: 2019-08-16

## 2019-08-13 RX ORDER — LISINOPRIL 2.5 MG/1
40 TABLET ORAL DAILY
Refills: 0 | Status: DISCONTINUED | OUTPATIENT
Start: 2019-08-13 | End: 2019-08-13

## 2019-08-13 RX ORDER — ACETAMINOPHEN 500 MG
650 TABLET ORAL EVERY 6 HOURS
Refills: 0 | Status: DISCONTINUED | OUTPATIENT
Start: 2019-08-13 | End: 2019-08-29

## 2019-08-13 RX ORDER — LISINOPRIL 2.5 MG/1
40 TABLET ORAL
Refills: 0 | Status: DISCONTINUED | OUTPATIENT
Start: 2019-08-13 | End: 2019-08-25

## 2019-08-13 RX ORDER — OXYCODONE HYDROCHLORIDE 5 MG/1
5 TABLET ORAL EVERY 4 HOURS
Refills: 0 | Status: DISCONTINUED | OUTPATIENT
Start: 2019-08-13 | End: 2019-08-16

## 2019-08-13 RX ORDER — CHOLECALCIFEROL (VITAMIN D3) 125 MCG
1000 CAPSULE ORAL DAILY
Refills: 0 | Status: DISCONTINUED | OUTPATIENT
Start: 2019-08-13 | End: 2019-08-29

## 2019-08-13 RX ORDER — LACTULOSE 10 G/15ML
20 SOLUTION ORAL
Refills: 0 | Status: DISCONTINUED | OUTPATIENT
Start: 2019-08-13 | End: 2019-08-13

## 2019-08-13 RX ORDER — ENOXAPARIN SODIUM 100 MG/ML
40 INJECTION SUBCUTANEOUS
Qty: 0 | Refills: 0 | DISCHARGE
Start: 2019-08-13

## 2019-08-13 RX ADMIN — Medication 1 TABLET(S): at 19:42

## 2019-08-13 RX ADMIN — ENOXAPARIN SODIUM 40 MILLIGRAM(S): 100 INJECTION SUBCUTANEOUS at 11:46

## 2019-08-13 RX ADMIN — Medication 650 MILLIGRAM(S): at 11:46

## 2019-08-13 RX ADMIN — Medication 81 MILLIGRAM(S): at 11:46

## 2019-08-13 RX ADMIN — ATORVASTATIN CALCIUM 40 MILLIGRAM(S): 80 TABLET, FILM COATED ORAL at 21:25

## 2019-08-13 RX ADMIN — OXYCODONE HYDROCHLORIDE 5 MILLIGRAM(S): 5 TABLET ORAL at 00:06

## 2019-08-13 RX ADMIN — Medication 1000 UNIT(S): at 11:46

## 2019-08-13 RX ADMIN — Medication 25 MILLIGRAM(S): at 05:07

## 2019-08-13 RX ADMIN — Medication 650 MILLIGRAM(S): at 20:24

## 2019-08-13 RX ADMIN — LACTULOSE 20 GRAM(S): 10 SOLUTION ORAL at 11:45

## 2019-08-13 RX ADMIN — PREGABALIN 1000 MICROGRAM(S): 225 CAPSULE ORAL at 11:46

## 2019-08-13 RX ADMIN — LISINOPRIL 40 MILLIGRAM(S): 2.5 TABLET ORAL at 05:07

## 2019-08-13 RX ADMIN — LISINOPRIL 40 MILLIGRAM(S): 2.5 TABLET ORAL at 19:42

## 2019-08-13 RX ADMIN — OXYCODONE HYDROCHLORIDE 5 MILLIGRAM(S): 5 TABLET ORAL at 10:05

## 2019-08-13 RX ADMIN — Medication 650 MILLIGRAM(S): at 11:47

## 2019-08-13 RX ADMIN — OXYCODONE HYDROCHLORIDE 5 MILLIGRAM(S): 5 TABLET ORAL at 10:26

## 2019-08-13 RX ADMIN — Medication 650 MILLIGRAM(S): at 05:07

## 2019-08-13 RX ADMIN — Medication 650 MILLIGRAM(S): at 19:41

## 2019-08-13 NOTE — DISCHARGE NOTE PROVIDER - CARE PROVIDER_API CALL
Howard Segovia)  Orthopaedic Surgery  [address] [place] [address]  Phone: [phone]  Fax: [phone]  Follow Up Time:

## 2019-08-13 NOTE — DISCHARGE NOTE PROVIDER - NSDCCPCAREPLAN_GEN_ALL_CORE_FT
PRINCIPAL DISCHARGE DIAGNOSIS  Diagnosis: Closed fracture of right femur, unspecified fracture morphology, unspecified portion of femur, initial encounter  Assessment and Plan of Treatment:       SECONDARY DISCHARGE DIAGNOSES  Diagnosis: Fall  Assessment and Plan of Treatment:     Diagnosis: Scalp laceration  Assessment and Plan of Treatment:

## 2019-08-13 NOTE — DISCHARGE NOTE PROVIDER - NSDCFUSCHEDAPPT_GEN_ALL_CORE_FT
NOEL QUEZADA ; 10/01/2019 ; [facility] [address] NOEL Cordova ; 10/08/2019 ; [facility] [address]

## 2019-08-13 NOTE — PROGRESS NOTE ADULT - REASON FOR ADMISSION
fall and head trauma

## 2019-08-13 NOTE — PROGRESS NOTE ADULT - SUBJECTIVE AND OBJECTIVE BOX
ORTHO PROGRESS NOTE       79yFemale POD # 4     S/P orif right periprosthetic femur fx     Patient seen and examined at bedside . The patient is awake and alert in NAD. No complaints of chest pain, SOB, N/V.    Vital Signs Last 24 Hrs  T(C): 36 (13 Aug 2019 00:00), Max: 36.7 (12 Aug 2019 16:01)  T(F): 96.8 (13 Aug 2019 00:00), Max: 98 (12 Aug 2019 16:01)  HR: 71 (13 Aug 2019 05:00) (69 - 89)  BP: 115/55 (13 Aug 2019 05:00) (97/50 - 131/53)  BP(mean): --  RR: 18 (13 Aug 2019 00:00) (18 - 18)  SpO2: 97% (12 Aug 2019 11:46) (97% - 97%)                          9.1    9.09  )-----------( 234      ( 12 Aug 2019 07:16 )             26.9                 DVT ppx : lovenox     MEDICATIONS  (STANDING):  acetaminophen   Tablet .. 650 milliGRAM(s) Oral every 6 hours  aspirin  chewable 81 milliGRAM(s) Oral daily  atorvastatin 40 milliGRAM(s) Oral at bedtime  cholecalciferol 1000 Unit(s) Oral daily  cyanocobalamin 1000 MICROGram(s) Oral daily  enoxaparin Injectable 40 milliGRAM(s) SubCutaneous daily  hydrochlorothiazide 25 milliGRAM(s) Oral daily  lisinopril 40 milliGRAM(s) Oral daily    MEDICATIONS  (PRN):  morphine  - Injectable 4 milliGRAM(s) IV Push every 4 hours PRN Severe Pain (7 - 10)  ondansetron Injectable 4 milliGRAM(s) IV Push every 6 hours PRN Nausea  oxyCODONE    IR 10 milliGRAM(s) Oral every 4 hours PRN Moderate Pain (4 - 6)  oxyCODONE    IR 5 milliGRAM(s) Oral every 4 hours PRN Mild Pain (1 - 3)      PE:   right hip dressing C/D/I          Compartments soft         NVI, SILT           A/P: 79yFemale POD # 4    S/P  orif right periprosthetic femur fx           OOB to Chair            Physical Therapy           Pain control            Incentive Spirometry            DVT Prophylaxis            Discharge planning - poss 4A

## 2019-08-13 NOTE — PROGRESS NOTE ADULT - ATTENDING COMMENTS
agree with above
seen and examined  agree with above  dvt ppx  is  scd  pt
femur fracture to OR with ortho today   transfer to ortho service PO
stable overnight  ortho to plan femor repair   cleared from trauma

## 2019-08-13 NOTE — DISCHARGE NOTE NURSING/CASE MANAGEMENT/SOCIAL WORK - NSDCDPATPORTLINK_GEN_ALL_CORE
You can access the [facility] Patient Portal, offered by [facility], by registering with the following website: [email or web address]

## 2019-08-14 PROBLEM — I10 ESSENTIAL (PRIMARY) HYPERTENSION: Chronic | Status: ACTIVE | Noted: 2019-08-07

## 2019-08-14 PROBLEM — E78.00 PURE HYPERCHOLESTEROLEMIA, UNSPECIFIED: Chronic | Status: ACTIVE | Noted: 2019-08-07

## 2019-08-14 LAB
ALBUMIN SERPL ELPH-MCNC: 3.1 G/DL — LOW (ref 3.5–5.2)
ALP SERPL-CCNC: 114 U/L — SIGNIFICANT CHANGE UP (ref 30–115)
ALT FLD-CCNC: 29 U/L — SIGNIFICANT CHANGE UP (ref 0–41)
ANION GAP SERPL CALC-SCNC: 14 MMOL/L — SIGNIFICANT CHANGE UP (ref 7–14)
AST SERPL-CCNC: 70 U/L — HIGH (ref 0–41)
BILIRUB SERPL-MCNC: 0.8 MG/DL — SIGNIFICANT CHANGE UP (ref 0.2–1.2)
BUN SERPL-MCNC: 34 MG/DL — HIGH (ref 10–20)
CALCIUM SERPL-MCNC: 9.5 MG/DL — SIGNIFICANT CHANGE UP (ref 8.5–10.1)
CHLORIDE SERPL-SCNC: 94 MMOL/L — LOW (ref 98–110)
CO2 SERPL-SCNC: 29 MMOL/L — SIGNIFICANT CHANGE UP (ref 17–32)
CREAT SERPL-MCNC: 0.8 MG/DL — SIGNIFICANT CHANGE UP (ref 0.7–1.5)
GLUCOSE SERPL-MCNC: 135 MG/DL — HIGH (ref 70–99)
HCT VFR BLD CALC: 27.5 % — LOW (ref 37–47)
HGB BLD-MCNC: 9.2 G/DL — LOW (ref 12–16)
MCHC RBC-ENTMCNC: 29.8 PG — SIGNIFICANT CHANGE UP (ref 27–31)
MCHC RBC-ENTMCNC: 33.5 G/DL — SIGNIFICANT CHANGE UP (ref 32–37)
MCV RBC AUTO: 89 FL — SIGNIFICANT CHANGE UP (ref 81–99)
NRBC # BLD: 0 /100 WBCS — SIGNIFICANT CHANGE UP (ref 0–0)
PLATELET # BLD AUTO: 356 K/UL — SIGNIFICANT CHANGE UP (ref 130–400)
POTASSIUM SERPL-MCNC: 3.9 MMOL/L — SIGNIFICANT CHANGE UP (ref 3.5–5)
POTASSIUM SERPL-SCNC: 3.9 MMOL/L — SIGNIFICANT CHANGE UP (ref 3.5–5)
PROT SERPL-MCNC: 5.9 G/DL — LOW (ref 6–8)
RBC # BLD: 3.09 M/UL — LOW (ref 4.2–5.4)
RBC # FLD: 12.9 % — SIGNIFICANT CHANGE UP (ref 11.5–14.5)
SODIUM SERPL-SCNC: 137 MMOL/L — SIGNIFICANT CHANGE UP (ref 135–146)
WBC # BLD: 9.29 K/UL — SIGNIFICANT CHANGE UP (ref 4.8–10.8)
WBC # FLD AUTO: 9.29 K/UL — SIGNIFICANT CHANGE UP (ref 4.8–10.8)

## 2019-08-14 RX ORDER — LANOLIN ALCOHOL/MO/W.PET/CERES
3 CREAM (GRAM) TOPICAL AT BEDTIME
Refills: 0 | Status: DISCONTINUED | OUTPATIENT
Start: 2019-08-14 | End: 2019-08-29

## 2019-08-14 RX ORDER — MAGNESIUM HYDROXIDE 400 MG/1
30 TABLET, CHEWABLE ORAL DAILY
Refills: 0 | Status: DISCONTINUED | OUTPATIENT
Start: 2019-08-14 | End: 2019-08-29

## 2019-08-14 RX ADMIN — Medication 81 MILLIGRAM(S): at 12:52

## 2019-08-14 RX ADMIN — PREGABALIN 1000 MICROGRAM(S): 225 CAPSULE ORAL at 12:53

## 2019-08-14 RX ADMIN — Medication 650 MILLIGRAM(S): at 06:11

## 2019-08-14 RX ADMIN — OXYCODONE HYDROCHLORIDE 5 MILLIGRAM(S): 5 TABLET ORAL at 11:40

## 2019-08-14 RX ADMIN — Medication 25 MILLIGRAM(S): at 05:56

## 2019-08-14 RX ADMIN — Medication 650 MILLIGRAM(S): at 19:49

## 2019-08-14 RX ADMIN — Medication 325 MILLIGRAM(S): at 12:54

## 2019-08-14 RX ADMIN — OXYCODONE HYDROCHLORIDE 5 MILLIGRAM(S): 5 TABLET ORAL at 22:31

## 2019-08-14 RX ADMIN — Medication 1 TABLET(S): at 05:56

## 2019-08-14 RX ADMIN — LISINOPRIL 40 MILLIGRAM(S): 2.5 TABLET ORAL at 17:41

## 2019-08-14 RX ADMIN — Medication 650 MILLIGRAM(S): at 05:57

## 2019-08-14 RX ADMIN — Medication 650 MILLIGRAM(S): at 15:08

## 2019-08-14 RX ADMIN — Medication 650 MILLIGRAM(S): at 12:52

## 2019-08-14 RX ADMIN — ATORVASTATIN CALCIUM 40 MILLIGRAM(S): 80 TABLET, FILM COATED ORAL at 21:13

## 2019-08-14 RX ADMIN — LISINOPRIL 40 MILLIGRAM(S): 2.5 TABLET ORAL at 05:55

## 2019-08-14 RX ADMIN — Medication 1 MILLIGRAM(S): at 12:54

## 2019-08-14 RX ADMIN — ENOXAPARIN SODIUM 40 MILLIGRAM(S): 100 INJECTION SUBCUTANEOUS at 12:53

## 2019-08-14 RX ADMIN — OXYCODONE HYDROCHLORIDE 5 MILLIGRAM(S): 5 TABLET ORAL at 12:55

## 2019-08-14 RX ADMIN — Medication 1 TABLET(S): at 17:40

## 2019-08-14 RX ADMIN — Medication 650 MILLIGRAM(S): at 17:40

## 2019-08-14 RX ADMIN — Medication 1000 UNIT(S): at 12:54

## 2019-08-15 LAB
MAGNESIUM SERPL-MCNC: 2.1 MG/DL — SIGNIFICANT CHANGE UP (ref 1.8–2.4)
PHOSPHATE SERPL-MCNC: 4.2 MG/DL — SIGNIFICANT CHANGE UP (ref 2.1–4.9)

## 2019-08-15 RX ORDER — MINERAL OIL
133 OIL (ML) MISCELLANEOUS DAILY
Refills: 0 | Status: DISCONTINUED | OUTPATIENT
Start: 2019-08-15 | End: 2019-08-29

## 2019-08-15 RX ADMIN — Medication 650 MILLIGRAM(S): at 14:59

## 2019-08-15 RX ADMIN — Medication 650 MILLIGRAM(S): at 05:52

## 2019-08-15 RX ADMIN — Medication 25 MILLIGRAM(S): at 05:52

## 2019-08-15 RX ADMIN — ATORVASTATIN CALCIUM 40 MILLIGRAM(S): 80 TABLET, FILM COATED ORAL at 21:55

## 2019-08-15 RX ADMIN — PREGABALIN 1000 MICROGRAM(S): 225 CAPSULE ORAL at 13:07

## 2019-08-15 RX ADMIN — LISINOPRIL 40 MILLIGRAM(S): 2.5 TABLET ORAL at 18:02

## 2019-08-15 RX ADMIN — Medication 650 MILLIGRAM(S): at 18:03

## 2019-08-15 RX ADMIN — OXYCODONE HYDROCHLORIDE 5 MILLIGRAM(S): 5 TABLET ORAL at 00:00

## 2019-08-15 RX ADMIN — Medication 1000 UNIT(S): at 13:05

## 2019-08-15 RX ADMIN — Medication 1 TABLET(S): at 05:52

## 2019-08-15 RX ADMIN — OXYCODONE HYDROCHLORIDE 5 MILLIGRAM(S): 5 TABLET ORAL at 23:27

## 2019-08-15 RX ADMIN — Medication 650 MILLIGRAM(S): at 13:06

## 2019-08-15 RX ADMIN — LISINOPRIL 40 MILLIGRAM(S): 2.5 TABLET ORAL at 05:52

## 2019-08-15 RX ADMIN — OXYCODONE HYDROCHLORIDE 5 MILLIGRAM(S): 5 TABLET ORAL at 21:58

## 2019-08-15 RX ADMIN — Medication 650 MILLIGRAM(S): at 00:12

## 2019-08-15 RX ADMIN — Medication 650 MILLIGRAM(S): at 00:13

## 2019-08-15 RX ADMIN — OXYCODONE HYDROCHLORIDE 5 MILLIGRAM(S): 5 TABLET ORAL at 08:49

## 2019-08-15 RX ADMIN — Medication 1 TABLET(S): at 18:03

## 2019-08-15 RX ADMIN — Medication 1 MILLIGRAM(S): at 13:05

## 2019-08-15 RX ADMIN — Medication 325 MILLIGRAM(S): at 13:06

## 2019-08-15 RX ADMIN — ENOXAPARIN SODIUM 40 MILLIGRAM(S): 100 INJECTION SUBCUTANEOUS at 13:05

## 2019-08-15 RX ADMIN — Medication 650 MILLIGRAM(S): at 23:26

## 2019-08-15 RX ADMIN — MAGNESIUM HYDROXIDE 30 MILLILITER(S): 400 TABLET, CHEWABLE ORAL at 08:59

## 2019-08-15 RX ADMIN — Medication 81 MILLIGRAM(S): at 13:07

## 2019-08-15 RX ADMIN — Medication 650 MILLIGRAM(S): at 05:55

## 2019-08-16 DIAGNOSIS — D62 ACUTE POSTHEMORRHAGIC ANEMIA: ICD-10-CM

## 2019-08-16 DIAGNOSIS — S72.21XA DISPLACED SUBTROCHANTERIC FRACTURE OF RIGHT FEMUR, INITIAL ENCOUNTER FOR CLOSED FRACTURE: ICD-10-CM

## 2019-08-16 DIAGNOSIS — R26.89 OTHER ABNORMALITIES OF GAIT AND MOBILITY: ICD-10-CM

## 2019-08-16 DIAGNOSIS — M97.01XA PERIPROSTHETIC FRACTURE AROUND INTERNAL PROSTHETIC RIGHT HIP JOINT, INITIAL ENCOUNTER: ICD-10-CM

## 2019-08-16 DIAGNOSIS — Z87.891 PERSONAL HISTORY OF NICOTINE DEPENDENCE: ICD-10-CM
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
## 2019-08-16 DIAGNOSIS — Z96.641 PRESENCE OF RIGHT ARTIFICIAL HIP JOINT: ICD-10-CM

## 2019-08-16 DIAGNOSIS — E78.00 PURE HYPERCHOLESTEROLEMIA, UNSPECIFIED: ICD-10-CM

## 2019-08-16 DIAGNOSIS — S01.01XA LACERATION WITHOUT FOREIGN BODY OF SCALP, INITIAL ENCOUNTER: ICD-10-CM

## 2019-08-16 DIAGNOSIS — E78.5 HYPERLIPIDEMIA, UNSPECIFIED: ICD-10-CM

## 2019-08-16 DIAGNOSIS — I10 ESSENTIAL (PRIMARY) HYPERTENSION: ICD-10-CM
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
## 2019-08-16 DIAGNOSIS — I45.10 UNSPECIFIED RIGHT BUNDLE-BRANCH BLOCK: ICD-10-CM

## 2019-08-16 RX ORDER — LIDOCAINE 4 G/100G
1 CREAM TOPICAL DAILY
Refills: 0 | Status: DISCONTINUED | OUTPATIENT
Start: 2019-08-16 | End: 2019-08-17

## 2019-08-16 RX ORDER — TRAMADOL HYDROCHLORIDE 50 MG/1
50 TABLET ORAL AT BEDTIME
Refills: 0 | Status: DISCONTINUED | OUTPATIENT
Start: 2019-08-16 | End: 2019-08-19

## 2019-08-16 RX ORDER — OXYCODONE HYDROCHLORIDE 5 MG/1
10 TABLET ORAL EVERY 4 HOURS
Refills: 0 | Status: DISCONTINUED | OUTPATIENT
Start: 2019-08-16 | End: 2019-08-19

## 2019-08-16 RX ORDER — OXYCODONE HYDROCHLORIDE 5 MG/1
5 TABLET ORAL EVERY 4 HOURS
Refills: 0 | Status: DISCONTINUED | OUTPATIENT
Start: 2019-08-16 | End: 2019-08-19

## 2019-08-16 RX ORDER — LIDOCAINE 4 G/100G
1 CREAM TOPICAL DAILY
Refills: 0 | Status: DISCONTINUED | OUTPATIENT
Start: 2019-08-16 | End: 2019-08-16

## 2019-08-16 RX ADMIN — LIDOCAINE 1 PATCH: 4 CREAM TOPICAL at 22:16

## 2019-08-16 RX ADMIN — Medication 650 MILLIGRAM(S): at 07:46

## 2019-08-16 RX ADMIN — Medication 1000 UNIT(S): at 12:28

## 2019-08-16 RX ADMIN — Medication 650 MILLIGRAM(S): at 23:45

## 2019-08-16 RX ADMIN — Medication 325 MILLIGRAM(S): at 12:28

## 2019-08-16 RX ADMIN — OXYCODONE HYDROCHLORIDE 5 MILLIGRAM(S): 5 TABLET ORAL at 22:09

## 2019-08-16 RX ADMIN — Medication 650 MILLIGRAM(S): at 17:08

## 2019-08-16 RX ADMIN — Medication 1 TABLET(S): at 06:28

## 2019-08-16 RX ADMIN — LIDOCAINE 1 PATCH: 4 CREAM TOPICAL at 17:10

## 2019-08-16 RX ADMIN — Medication 650 MILLIGRAM(S): at 12:28

## 2019-08-16 RX ADMIN — OXYCODONE HYDROCHLORIDE 5 MILLIGRAM(S): 5 TABLET ORAL at 14:55

## 2019-08-16 RX ADMIN — PREGABALIN 1000 MICROGRAM(S): 225 CAPSULE ORAL at 12:28

## 2019-08-16 RX ADMIN — ENOXAPARIN SODIUM 40 MILLIGRAM(S): 100 INJECTION SUBCUTANEOUS at 12:27

## 2019-08-16 RX ADMIN — Medication 81 MILLIGRAM(S): at 12:28

## 2019-08-16 RX ADMIN — Medication 1 MILLIGRAM(S): at 12:28

## 2019-08-16 RX ADMIN — MAGNESIUM HYDROXIDE 30 MILLILITER(S): 400 TABLET, CHEWABLE ORAL at 20:51

## 2019-08-16 RX ADMIN — ATORVASTATIN CALCIUM 40 MILLIGRAM(S): 80 TABLET, FILM COATED ORAL at 22:06

## 2019-08-16 RX ADMIN — OXYCODONE HYDROCHLORIDE 5 MILLIGRAM(S): 5 TABLET ORAL at 15:06

## 2019-08-16 RX ADMIN — Medication 1 TABLET(S): at 17:08

## 2019-08-16 RX ADMIN — Medication 650 MILLIGRAM(S): at 13:50

## 2019-08-16 RX ADMIN — Medication 650 MILLIGRAM(S): at 06:28

## 2019-08-17 RX ORDER — LIDOCAINE 4 G/100G
1 CREAM TOPICAL DAILY
Refills: 0 | Status: COMPLETED | OUTPATIENT
Start: 2019-08-17 | End: 2019-08-17

## 2019-08-17 RX ADMIN — MAGNESIUM HYDROXIDE 30 MILLILITER(S): 400 TABLET, CHEWABLE ORAL at 18:32

## 2019-08-17 RX ADMIN — Medication 650 MILLIGRAM(S): at 07:59

## 2019-08-17 RX ADMIN — ATORVASTATIN CALCIUM 40 MILLIGRAM(S): 80 TABLET, FILM COATED ORAL at 21:14

## 2019-08-17 RX ADMIN — Medication 650 MILLIGRAM(S): at 05:55

## 2019-08-17 RX ADMIN — LIDOCAINE 1 PATCH: 4 CREAM TOPICAL at 19:58

## 2019-08-17 RX ADMIN — Medication 25 MILLIGRAM(S): at 05:56

## 2019-08-17 RX ADMIN — LIDOCAINE 1 PATCH: 4 CREAM TOPICAL at 06:59

## 2019-08-17 RX ADMIN — Medication 650 MILLIGRAM(S): at 12:19

## 2019-08-17 RX ADMIN — Medication 650 MILLIGRAM(S): at 17:25

## 2019-08-17 RX ADMIN — Medication 1 TABLET(S): at 17:25

## 2019-08-17 RX ADMIN — Medication 1 TABLET(S): at 05:56

## 2019-08-17 RX ADMIN — Medication 81 MILLIGRAM(S): at 12:19

## 2019-08-17 RX ADMIN — OXYCODONE HYDROCHLORIDE 5 MILLIGRAM(S): 5 TABLET ORAL at 23:52

## 2019-08-17 RX ADMIN — LIDOCAINE 1 PATCH: 4 CREAM TOPICAL at 17:30

## 2019-08-17 RX ADMIN — Medication 1 MILLIGRAM(S): at 12:19

## 2019-08-17 RX ADMIN — LISINOPRIL 40 MILLIGRAM(S): 2.5 TABLET ORAL at 17:27

## 2019-08-17 RX ADMIN — Medication 1000 UNIT(S): at 12:21

## 2019-08-17 RX ADMIN — PREGABALIN 1000 MICROGRAM(S): 225 CAPSULE ORAL at 12:19

## 2019-08-17 RX ADMIN — OXYCODONE HYDROCHLORIDE 5 MILLIGRAM(S): 5 TABLET ORAL at 08:01

## 2019-08-17 RX ADMIN — Medication 325 MILLIGRAM(S): at 12:19

## 2019-08-17 RX ADMIN — OXYCODONE HYDROCHLORIDE 5 MILLIGRAM(S): 5 TABLET ORAL at 12:18

## 2019-08-17 RX ADMIN — Medication 650 MILLIGRAM(S): at 23:52

## 2019-08-17 RX ADMIN — Medication 650 MILLIGRAM(S): at 23:51

## 2019-08-17 RX ADMIN — OXYCODONE HYDROCHLORIDE 5 MILLIGRAM(S): 5 TABLET ORAL at 12:39

## 2019-08-17 RX ADMIN — OXYCODONE HYDROCHLORIDE 5 MILLIGRAM(S): 5 TABLET ORAL at 22:15

## 2019-08-17 RX ADMIN — Medication 650 MILLIGRAM(S): at 12:39

## 2019-08-17 RX ADMIN — Medication 3 MILLIGRAM(S): at 21:15

## 2019-08-17 RX ADMIN — Medication 650 MILLIGRAM(S): at 17:27

## 2019-08-17 RX ADMIN — ENOXAPARIN SODIUM 40 MILLIGRAM(S): 100 INJECTION SUBCUTANEOUS at 12:20

## 2019-08-18 RX ADMIN — OXYCODONE HYDROCHLORIDE 5 MILLIGRAM(S): 5 TABLET ORAL at 13:19

## 2019-08-18 RX ADMIN — LISINOPRIL 40 MILLIGRAM(S): 2.5 TABLET ORAL at 17:28

## 2019-08-18 RX ADMIN — Medication 25 MILLIGRAM(S): at 06:05

## 2019-08-18 RX ADMIN — MAGNESIUM HYDROXIDE 30 MILLILITER(S): 400 TABLET, CHEWABLE ORAL at 17:18

## 2019-08-18 RX ADMIN — Medication 650 MILLIGRAM(S): at 13:17

## 2019-08-18 RX ADMIN — Medication 650 MILLIGRAM(S): at 13:18

## 2019-08-18 RX ADMIN — LIDOCAINE 1 PATCH: 4 CREAM TOPICAL at 06:04

## 2019-08-18 RX ADMIN — Medication 1 TABLET(S): at 06:05

## 2019-08-18 RX ADMIN — Medication 1000 UNIT(S): at 13:17

## 2019-08-18 RX ADMIN — Medication 650 MILLIGRAM(S): at 06:04

## 2019-08-18 RX ADMIN — Medication 10 MILLIGRAM(S): at 16:21

## 2019-08-18 RX ADMIN — PREGABALIN 1000 MICROGRAM(S): 225 CAPSULE ORAL at 13:17

## 2019-08-18 RX ADMIN — Medication 325 MILLIGRAM(S): at 13:17

## 2019-08-18 RX ADMIN — Medication 650 MILLIGRAM(S): at 06:13

## 2019-08-18 RX ADMIN — Medication 133 MILLILITER(S): at 21:28

## 2019-08-18 RX ADMIN — Medication 1 MILLIGRAM(S): at 13:17

## 2019-08-18 RX ADMIN — OXYCODONE HYDROCHLORIDE 5 MILLIGRAM(S): 5 TABLET ORAL at 07:59

## 2019-08-18 RX ADMIN — Medication 1 TABLET(S): at 17:27

## 2019-08-18 RX ADMIN — LISINOPRIL 40 MILLIGRAM(S): 2.5 TABLET ORAL at 06:06

## 2019-08-18 RX ADMIN — Medication 81 MILLIGRAM(S): at 13:17

## 2019-08-18 RX ADMIN — Medication 650 MILLIGRAM(S): at 17:28

## 2019-08-18 RX ADMIN — ATORVASTATIN CALCIUM 40 MILLIGRAM(S): 80 TABLET, FILM COATED ORAL at 21:12

## 2019-08-18 RX ADMIN — ENOXAPARIN SODIUM 40 MILLIGRAM(S): 100 INJECTION SUBCUTANEOUS at 13:17

## 2019-08-18 RX ADMIN — Medication 650 MILLIGRAM(S): at 17:27

## 2019-08-19 LAB
ANION GAP SERPL CALC-SCNC: 11 MMOL/L — SIGNIFICANT CHANGE UP (ref 7–14)
BUN SERPL-MCNC: 21 MG/DL — HIGH (ref 10–20)
CALCIUM SERPL-MCNC: 9 MG/DL — SIGNIFICANT CHANGE UP (ref 8.5–10.1)
CHLORIDE SERPL-SCNC: 97 MMOL/L — LOW (ref 98–110)
CO2 SERPL-SCNC: 27 MMOL/L — SIGNIFICANT CHANGE UP (ref 17–32)
CREAT SERPL-MCNC: 0.7 MG/DL — SIGNIFICANT CHANGE UP (ref 0.7–1.5)
GLUCOSE SERPL-MCNC: 139 MG/DL — HIGH (ref 70–99)
HCT VFR BLD CALC: 28.3 % — LOW (ref 37–47)
HGB BLD-MCNC: 9 G/DL — LOW (ref 12–16)
MCHC RBC-ENTMCNC: 29.4 PG — SIGNIFICANT CHANGE UP (ref 27–31)
MCHC RBC-ENTMCNC: 31.8 G/DL — LOW (ref 32–37)
MCV RBC AUTO: 92.5 FL — SIGNIFICANT CHANGE UP (ref 81–99)
NRBC # BLD: 0 /100 WBCS — SIGNIFICANT CHANGE UP (ref 0–0)
PLATELET # BLD AUTO: 514 K/UL — HIGH (ref 130–400)
POTASSIUM SERPL-MCNC: 5.1 MMOL/L — HIGH (ref 3.5–5)
POTASSIUM SERPL-SCNC: 5.1 MMOL/L — HIGH (ref 3.5–5)
RBC # BLD: 3.06 M/UL — LOW (ref 4.2–5.4)
RBC # FLD: 13.7 % — SIGNIFICANT CHANGE UP (ref 11.5–14.5)
SODIUM SERPL-SCNC: 135 MMOL/L — SIGNIFICANT CHANGE UP (ref 135–146)
WBC # BLD: 15.19 K/UL — HIGH (ref 4.8–10.8)
WBC # FLD AUTO: 15.19 K/UL — HIGH (ref 4.8–10.8)

## 2019-08-19 RX ORDER — OXYCODONE HYDROCHLORIDE 5 MG/1
10 TABLET ORAL EVERY 4 HOURS
Refills: 0 | Status: DISCONTINUED | OUTPATIENT
Start: 2019-08-19 | End: 2019-08-25

## 2019-08-19 RX ORDER — TRAMADOL HYDROCHLORIDE 50 MG/1
50 TABLET ORAL AT BEDTIME
Refills: 0 | Status: DISCONTINUED | OUTPATIENT
Start: 2019-08-19 | End: 2019-08-22

## 2019-08-19 RX ORDER — DOCUSATE SODIUM 100 MG
100 CAPSULE ORAL THREE TIMES A DAY
Refills: 0 | Status: DISCONTINUED | OUTPATIENT
Start: 2019-08-19 | End: 2019-08-29

## 2019-08-19 RX ORDER — OXYCODONE HYDROCHLORIDE 5 MG/1
5 TABLET ORAL EVERY 4 HOURS
Refills: 0 | Status: DISCONTINUED | OUTPATIENT
Start: 2019-08-19 | End: 2019-08-25

## 2019-08-19 RX ADMIN — Medication 325 MILLIGRAM(S): at 16:16

## 2019-08-19 RX ADMIN — Medication 650 MILLIGRAM(S): at 00:24

## 2019-08-19 RX ADMIN — Medication 650 MILLIGRAM(S): at 12:02

## 2019-08-19 RX ADMIN — Medication 81 MILLIGRAM(S): at 12:02

## 2019-08-19 RX ADMIN — OXYCODONE HYDROCHLORIDE 5 MILLIGRAM(S): 5 TABLET ORAL at 13:00

## 2019-08-19 RX ADMIN — ATORVASTATIN CALCIUM 40 MILLIGRAM(S): 80 TABLET, FILM COATED ORAL at 21:40

## 2019-08-19 RX ADMIN — LISINOPRIL 40 MILLIGRAM(S): 2.5 TABLET ORAL at 06:07

## 2019-08-19 RX ADMIN — ENOXAPARIN SODIUM 40 MILLIGRAM(S): 100 INJECTION SUBCUTANEOUS at 12:03

## 2019-08-19 RX ADMIN — Medication 650 MILLIGRAM(S): at 06:12

## 2019-08-19 RX ADMIN — Medication 650 MILLIGRAM(S): at 06:07

## 2019-08-19 RX ADMIN — Medication 650 MILLIGRAM(S): at 19:44

## 2019-08-19 RX ADMIN — Medication 650 MILLIGRAM(S): at 17:53

## 2019-08-19 RX ADMIN — Medication 1 MILLIGRAM(S): at 16:16

## 2019-08-19 RX ADMIN — Medication 100 MILLIGRAM(S): at 21:41

## 2019-08-19 RX ADMIN — Medication 25 MILLIGRAM(S): at 06:07

## 2019-08-19 RX ADMIN — TRAMADOL HYDROCHLORIDE 50 MILLIGRAM(S): 50 TABLET ORAL at 21:40

## 2019-08-19 RX ADMIN — Medication 1 TABLET(S): at 17:55

## 2019-08-19 RX ADMIN — Medication 650 MILLIGRAM(S): at 13:00

## 2019-08-19 RX ADMIN — OXYCODONE HYDROCHLORIDE 5 MILLIGRAM(S): 5 TABLET ORAL at 08:03

## 2019-08-19 RX ADMIN — Medication 100 MILLIGRAM(S): at 16:16

## 2019-08-19 RX ADMIN — Medication 650 MILLIGRAM(S): at 00:28

## 2019-08-19 RX ADMIN — TRAMADOL HYDROCHLORIDE 50 MILLIGRAM(S): 50 TABLET ORAL at 22:14

## 2019-08-19 RX ADMIN — Medication 1 TABLET(S): at 06:08

## 2019-08-19 RX ADMIN — OXYCODONE HYDROCHLORIDE 5 MILLIGRAM(S): 5 TABLET ORAL at 11:57

## 2019-08-19 RX ADMIN — PREGABALIN 1000 MICROGRAM(S): 225 CAPSULE ORAL at 12:04

## 2019-08-19 RX ADMIN — Medication 1000 UNIT(S): at 12:02

## 2019-08-20 RX ADMIN — Medication 650 MILLIGRAM(S): at 12:12

## 2019-08-20 RX ADMIN — Medication 100 MILLIGRAM(S): at 12:31

## 2019-08-20 RX ADMIN — TRAMADOL HYDROCHLORIDE 50 MILLIGRAM(S): 50 TABLET ORAL at 22:59

## 2019-08-20 RX ADMIN — OXYCODONE HYDROCHLORIDE 5 MILLIGRAM(S): 5 TABLET ORAL at 12:11

## 2019-08-20 RX ADMIN — Medication 1 TABLET(S): at 06:07

## 2019-08-20 RX ADMIN — LISINOPRIL 40 MILLIGRAM(S): 2.5 TABLET ORAL at 06:07

## 2019-08-20 RX ADMIN — Medication 650 MILLIGRAM(S): at 17:51

## 2019-08-20 RX ADMIN — OXYCODONE HYDROCHLORIDE 5 MILLIGRAM(S): 5 TABLET ORAL at 09:00

## 2019-08-20 RX ADMIN — Medication 25 MILLIGRAM(S): at 06:07

## 2019-08-20 RX ADMIN — ATORVASTATIN CALCIUM 40 MILLIGRAM(S): 80 TABLET, FILM COATED ORAL at 21:20

## 2019-08-20 RX ADMIN — Medication 650 MILLIGRAM(S): at 06:21

## 2019-08-20 RX ADMIN — Medication 650 MILLIGRAM(S): at 17:42

## 2019-08-20 RX ADMIN — Medication 1 TABLET(S): at 17:42

## 2019-08-20 RX ADMIN — Medication 650 MILLIGRAM(S): at 06:07

## 2019-08-20 RX ADMIN — Medication 81 MILLIGRAM(S): at 12:14

## 2019-08-20 RX ADMIN — OXYCODONE HYDROCHLORIDE 5 MILLIGRAM(S): 5 TABLET ORAL at 13:00

## 2019-08-20 RX ADMIN — Medication 1 MILLIGRAM(S): at 12:25

## 2019-08-20 RX ADMIN — OXYCODONE HYDROCHLORIDE 5 MILLIGRAM(S): 5 TABLET ORAL at 08:11

## 2019-08-20 RX ADMIN — Medication 1000 UNIT(S): at 12:14

## 2019-08-20 RX ADMIN — PREGABALIN 1000 MICROGRAM(S): 225 CAPSULE ORAL at 12:14

## 2019-08-20 RX ADMIN — Medication 325 MILLIGRAM(S): at 12:25

## 2019-08-20 RX ADMIN — ENOXAPARIN SODIUM 40 MILLIGRAM(S): 100 INJECTION SUBCUTANEOUS at 12:15

## 2019-08-20 RX ADMIN — Medication 100 MILLIGRAM(S): at 06:07

## 2019-08-20 RX ADMIN — TRAMADOL HYDROCHLORIDE 50 MILLIGRAM(S): 50 TABLET ORAL at 21:19

## 2019-08-21 LAB
ANION GAP SERPL CALC-SCNC: 12 MMOL/L — SIGNIFICANT CHANGE UP (ref 7–14)
BUN SERPL-MCNC: 25 MG/DL — HIGH (ref 10–20)
CALCIUM SERPL-MCNC: 9.2 MG/DL — SIGNIFICANT CHANGE UP (ref 8.5–10.1)
CHLORIDE SERPL-SCNC: 98 MMOL/L — SIGNIFICANT CHANGE UP (ref 98–110)
CO2 SERPL-SCNC: 25 MMOL/L — SIGNIFICANT CHANGE UP (ref 17–32)
CREAT SERPL-MCNC: 0.8 MG/DL — SIGNIFICANT CHANGE UP (ref 0.7–1.5)
GLUCOSE SERPL-MCNC: 115 MG/DL — HIGH (ref 70–99)
HCT VFR BLD CALC: 28.2 % — LOW (ref 37–47)
HGB BLD-MCNC: 9.2 G/DL — LOW (ref 12–16)
MCHC RBC-ENTMCNC: 30.1 PG — SIGNIFICANT CHANGE UP (ref 27–31)
MCHC RBC-ENTMCNC: 32.6 G/DL — SIGNIFICANT CHANGE UP (ref 32–37)
MCV RBC AUTO: 92.2 FL — SIGNIFICANT CHANGE UP (ref 81–99)
NRBC # BLD: 0 /100 WBCS — SIGNIFICANT CHANGE UP (ref 0–0)
PLATELET # BLD AUTO: 538 K/UL — HIGH (ref 130–400)
POTASSIUM SERPL-MCNC: 5 MMOL/L — SIGNIFICANT CHANGE UP (ref 3.5–5)
POTASSIUM SERPL-SCNC: 5 MMOL/L — SIGNIFICANT CHANGE UP (ref 3.5–5)
RBC # BLD: 3.06 M/UL — LOW (ref 4.2–5.4)
RBC # FLD: 14.2 % — SIGNIFICANT CHANGE UP (ref 11.5–14.5)
SODIUM SERPL-SCNC: 135 MMOL/L — SIGNIFICANT CHANGE UP (ref 135–146)
WBC # BLD: 11.85 K/UL — HIGH (ref 4.8–10.8)
WBC # FLD AUTO: 11.85 K/UL — HIGH (ref 4.8–10.8)

## 2019-08-21 RX ADMIN — Medication 1 TABLET(S): at 05:52

## 2019-08-21 RX ADMIN — OXYCODONE HYDROCHLORIDE 5 MILLIGRAM(S): 5 TABLET ORAL at 08:52

## 2019-08-21 RX ADMIN — Medication 325 MILLIGRAM(S): at 11:15

## 2019-08-21 RX ADMIN — Medication 650 MILLIGRAM(S): at 17:36

## 2019-08-21 RX ADMIN — Medication 650 MILLIGRAM(S): at 05:52

## 2019-08-21 RX ADMIN — OXYCODONE HYDROCHLORIDE 5 MILLIGRAM(S): 5 TABLET ORAL at 11:20

## 2019-08-21 RX ADMIN — LISINOPRIL 40 MILLIGRAM(S): 2.5 TABLET ORAL at 05:52

## 2019-08-21 RX ADMIN — ENOXAPARIN SODIUM 40 MILLIGRAM(S): 100 INJECTION SUBCUTANEOUS at 11:16

## 2019-08-21 RX ADMIN — Medication 650 MILLIGRAM(S): at 13:39

## 2019-08-21 RX ADMIN — Medication 1000 UNIT(S): at 11:15

## 2019-08-21 RX ADMIN — Medication 650 MILLIGRAM(S): at 17:34

## 2019-08-21 RX ADMIN — Medication 650 MILLIGRAM(S): at 01:01

## 2019-08-21 RX ADMIN — Medication 100 MILLIGRAM(S): at 21:15

## 2019-08-21 RX ADMIN — Medication 1 MILLIGRAM(S): at 11:15

## 2019-08-21 RX ADMIN — Medication 650 MILLIGRAM(S): at 11:15

## 2019-08-21 RX ADMIN — Medication 25 MILLIGRAM(S): at 05:52

## 2019-08-21 RX ADMIN — Medication 100 MILLIGRAM(S): at 05:53

## 2019-08-21 RX ADMIN — Medication 1 TABLET(S): at 17:36

## 2019-08-21 RX ADMIN — LISINOPRIL 40 MILLIGRAM(S): 2.5 TABLET ORAL at 17:34

## 2019-08-21 RX ADMIN — PREGABALIN 1000 MICROGRAM(S): 225 CAPSULE ORAL at 11:16

## 2019-08-21 RX ADMIN — Medication 81 MILLIGRAM(S): at 11:15

## 2019-08-21 RX ADMIN — Medication 650 MILLIGRAM(S): at 00:59

## 2019-08-21 RX ADMIN — ATORVASTATIN CALCIUM 40 MILLIGRAM(S): 80 TABLET, FILM COATED ORAL at 21:15

## 2019-08-22 RX ORDER — TRAMADOL HYDROCHLORIDE 50 MG/1
50 TABLET ORAL AT BEDTIME
Refills: 0 | Status: DISCONTINUED | OUTPATIENT
Start: 2019-08-22 | End: 2019-08-25

## 2019-08-22 RX ADMIN — Medication 100 MILLIGRAM(S): at 12:03

## 2019-08-22 RX ADMIN — Medication 100 MILLIGRAM(S): at 21:39

## 2019-08-22 RX ADMIN — Medication 1000 UNIT(S): at 12:03

## 2019-08-22 RX ADMIN — Medication 650 MILLIGRAM(S): at 19:22

## 2019-08-22 RX ADMIN — LISINOPRIL 40 MILLIGRAM(S): 2.5 TABLET ORAL at 17:11

## 2019-08-22 RX ADMIN — Medication 1 MILLIGRAM(S): at 12:03

## 2019-08-22 RX ADMIN — Medication 1 TABLET(S): at 06:03

## 2019-08-22 RX ADMIN — OXYCODONE HYDROCHLORIDE 5 MILLIGRAM(S): 5 TABLET ORAL at 08:55

## 2019-08-22 RX ADMIN — Medication 650 MILLIGRAM(S): at 06:32

## 2019-08-22 RX ADMIN — LISINOPRIL 40 MILLIGRAM(S): 2.5 TABLET ORAL at 06:03

## 2019-08-22 RX ADMIN — Medication 650 MILLIGRAM(S): at 06:04

## 2019-08-22 RX ADMIN — PREGABALIN 1000 MICROGRAM(S): 225 CAPSULE ORAL at 12:05

## 2019-08-22 RX ADMIN — Medication 25 MILLIGRAM(S): at 06:03

## 2019-08-22 RX ADMIN — Medication 650 MILLIGRAM(S): at 17:11

## 2019-08-22 RX ADMIN — Medication 100 MILLIGRAM(S): at 06:04

## 2019-08-22 RX ADMIN — OXYCODONE HYDROCHLORIDE 5 MILLIGRAM(S): 5 TABLET ORAL at 09:52

## 2019-08-22 RX ADMIN — ATORVASTATIN CALCIUM 40 MILLIGRAM(S): 80 TABLET, FILM COATED ORAL at 21:39

## 2019-08-22 RX ADMIN — Medication 1 TABLET(S): at 17:11

## 2019-08-22 RX ADMIN — Medication 325 MILLIGRAM(S): at 12:03

## 2019-08-22 RX ADMIN — Medication 650 MILLIGRAM(S): at 14:17

## 2019-08-22 RX ADMIN — Medication 650 MILLIGRAM(S): at 12:02

## 2019-08-22 RX ADMIN — Medication 650 MILLIGRAM(S): at 23:25

## 2019-08-22 RX ADMIN — Medication 81 MILLIGRAM(S): at 12:03

## 2019-08-22 RX ADMIN — ENOXAPARIN SODIUM 40 MILLIGRAM(S): 100 INJECTION SUBCUTANEOUS at 12:04

## 2019-08-23 RX ADMIN — LISINOPRIL 40 MILLIGRAM(S): 2.5 TABLET ORAL at 18:30

## 2019-08-23 RX ADMIN — Medication 650 MILLIGRAM(S): at 11:55

## 2019-08-23 RX ADMIN — ATORVASTATIN CALCIUM 40 MILLIGRAM(S): 80 TABLET, FILM COATED ORAL at 21:43

## 2019-08-23 RX ADMIN — PREGABALIN 1000 MICROGRAM(S): 225 CAPSULE ORAL at 11:55

## 2019-08-23 RX ADMIN — Medication 325 MILLIGRAM(S): at 11:55

## 2019-08-23 RX ADMIN — Medication 1000 UNIT(S): at 11:56

## 2019-08-23 RX ADMIN — OXYCODONE HYDROCHLORIDE 5 MILLIGRAM(S): 5 TABLET ORAL at 09:00

## 2019-08-23 RX ADMIN — Medication 650 MILLIGRAM(S): at 06:38

## 2019-08-23 RX ADMIN — Medication 100 MILLIGRAM(S): at 21:43

## 2019-08-23 RX ADMIN — Medication 650 MILLIGRAM(S): at 13:27

## 2019-08-23 RX ADMIN — Medication 650 MILLIGRAM(S): at 19:23

## 2019-08-23 RX ADMIN — Medication 25 MILLIGRAM(S): at 06:25

## 2019-08-23 RX ADMIN — Medication 100 MILLIGRAM(S): at 13:29

## 2019-08-23 RX ADMIN — TRAMADOL HYDROCHLORIDE 50 MILLIGRAM(S): 50 TABLET ORAL at 21:47

## 2019-08-23 RX ADMIN — Medication 650 MILLIGRAM(S): at 18:31

## 2019-08-23 RX ADMIN — OXYCODONE HYDROCHLORIDE 5 MILLIGRAM(S): 5 TABLET ORAL at 11:56

## 2019-08-23 RX ADMIN — ENOXAPARIN SODIUM 40 MILLIGRAM(S): 100 INJECTION SUBCUTANEOUS at 11:55

## 2019-08-23 RX ADMIN — Medication 81 MILLIGRAM(S): at 11:56

## 2019-08-23 RX ADMIN — Medication 650 MILLIGRAM(S): at 06:24

## 2019-08-23 RX ADMIN — Medication 1 MILLIGRAM(S): at 11:55

## 2019-08-23 RX ADMIN — TRAMADOL HYDROCHLORIDE 50 MILLIGRAM(S): 50 TABLET ORAL at 23:57

## 2019-08-23 RX ADMIN — LISINOPRIL 40 MILLIGRAM(S): 2.5 TABLET ORAL at 06:25

## 2019-08-23 RX ADMIN — Medication 1 TABLET(S): at 06:25

## 2019-08-23 RX ADMIN — Medication 100 MILLIGRAM(S): at 06:25

## 2019-08-23 RX ADMIN — Medication 1 TABLET(S): at 18:31

## 2019-08-24 RX ADMIN — LISINOPRIL 40 MILLIGRAM(S): 2.5 TABLET ORAL at 06:35

## 2019-08-24 RX ADMIN — Medication 100 MILLIGRAM(S): at 21:45

## 2019-08-24 RX ADMIN — Medication 1 TABLET(S): at 17:35

## 2019-08-24 RX ADMIN — Medication 650 MILLIGRAM(S): at 06:35

## 2019-08-24 RX ADMIN — OXYCODONE HYDROCHLORIDE 10 MILLIGRAM(S): 5 TABLET ORAL at 12:08

## 2019-08-24 RX ADMIN — Medication 650 MILLIGRAM(S): at 12:08

## 2019-08-24 RX ADMIN — Medication 25 MILLIGRAM(S): at 06:35

## 2019-08-24 RX ADMIN — ATORVASTATIN CALCIUM 40 MILLIGRAM(S): 80 TABLET, FILM COATED ORAL at 21:45

## 2019-08-24 RX ADMIN — Medication 1 TABLET(S): at 06:35

## 2019-08-24 RX ADMIN — Medication 650 MILLIGRAM(S): at 17:35

## 2019-08-24 RX ADMIN — Medication 100 MILLIGRAM(S): at 12:08

## 2019-08-24 RX ADMIN — Medication 650 MILLIGRAM(S): at 13:12

## 2019-08-24 RX ADMIN — Medication 81 MILLIGRAM(S): at 12:08

## 2019-08-24 RX ADMIN — TRAMADOL HYDROCHLORIDE 50 MILLIGRAM(S): 50 TABLET ORAL at 21:51

## 2019-08-24 RX ADMIN — Medication 650 MILLIGRAM(S): at 23:33

## 2019-08-24 RX ADMIN — Medication 1 MILLIGRAM(S): at 12:08

## 2019-08-24 RX ADMIN — Medication 325 MILLIGRAM(S): at 12:08

## 2019-08-24 RX ADMIN — LISINOPRIL 40 MILLIGRAM(S): 2.5 TABLET ORAL at 17:34

## 2019-08-24 RX ADMIN — Medication 650 MILLIGRAM(S): at 23:26

## 2019-08-24 RX ADMIN — Medication 650 MILLIGRAM(S): at 17:47

## 2019-08-24 RX ADMIN — PREGABALIN 1000 MICROGRAM(S): 225 CAPSULE ORAL at 12:08

## 2019-08-24 RX ADMIN — Medication 100 MILLIGRAM(S): at 06:35

## 2019-08-24 RX ADMIN — TRAMADOL HYDROCHLORIDE 50 MILLIGRAM(S): 50 TABLET ORAL at 22:39

## 2019-08-24 RX ADMIN — Medication 650 MILLIGRAM(S): at 07:15

## 2019-08-24 RX ADMIN — ENOXAPARIN SODIUM 40 MILLIGRAM(S): 100 INJECTION SUBCUTANEOUS at 12:09

## 2019-08-24 RX ADMIN — OXYCODONE HYDROCHLORIDE 10 MILLIGRAM(S): 5 TABLET ORAL at 13:13

## 2019-08-24 RX ADMIN — Medication 1000 UNIT(S): at 12:08

## 2019-08-25 RX ORDER — TRAMADOL HYDROCHLORIDE 50 MG/1
50 TABLET ORAL AT BEDTIME
Refills: 0 | Status: DISCONTINUED | OUTPATIENT
Start: 2019-08-25 | End: 2019-08-29

## 2019-08-25 RX ORDER — OXYCODONE HYDROCHLORIDE 5 MG/1
10 TABLET ORAL EVERY 4 HOURS
Refills: 0 | Status: DISCONTINUED | OUTPATIENT
Start: 2019-08-25 | End: 2019-08-29

## 2019-08-25 RX ORDER — LISINOPRIL 2.5 MG/1
40 TABLET ORAL
Refills: 0 | Status: DISCONTINUED | OUTPATIENT
Start: 2019-08-25 | End: 2019-08-25

## 2019-08-25 RX ORDER — LISINOPRIL 2.5 MG/1
40 TABLET ORAL DAILY
Refills: 0 | Status: DISCONTINUED | OUTPATIENT
Start: 2019-08-25 | End: 2019-08-29

## 2019-08-25 RX ORDER — OXYCODONE HYDROCHLORIDE 5 MG/1
5 TABLET ORAL EVERY 4 HOURS
Refills: 0 | Status: DISCONTINUED | OUTPATIENT
Start: 2019-08-25 | End: 2019-08-29

## 2019-08-25 RX ADMIN — Medication 650 MILLIGRAM(S): at 17:36

## 2019-08-25 RX ADMIN — ENOXAPARIN SODIUM 40 MILLIGRAM(S): 100 INJECTION SUBCUTANEOUS at 12:45

## 2019-08-25 RX ADMIN — TRAMADOL HYDROCHLORIDE 50 MILLIGRAM(S): 50 TABLET ORAL at 22:17

## 2019-08-25 RX ADMIN — ATORVASTATIN CALCIUM 40 MILLIGRAM(S): 80 TABLET, FILM COATED ORAL at 22:15

## 2019-08-25 RX ADMIN — Medication 650 MILLIGRAM(S): at 06:43

## 2019-08-25 RX ADMIN — Medication 100 MILLIGRAM(S): at 22:15

## 2019-08-25 RX ADMIN — TRAMADOL HYDROCHLORIDE 50 MILLIGRAM(S): 50 TABLET ORAL at 23:49

## 2019-08-25 RX ADMIN — Medication 1 TABLET(S): at 05:15

## 2019-08-25 RX ADMIN — Medication 650 MILLIGRAM(S): at 14:23

## 2019-08-25 RX ADMIN — Medication 1000 UNIT(S): at 12:47

## 2019-08-25 RX ADMIN — Medication 650 MILLIGRAM(S): at 05:15

## 2019-08-25 RX ADMIN — OXYCODONE HYDROCHLORIDE 5 MILLIGRAM(S): 5 TABLET ORAL at 10:48

## 2019-08-25 RX ADMIN — Medication 81 MILLIGRAM(S): at 12:47

## 2019-08-25 RX ADMIN — Medication 1 MILLIGRAM(S): at 12:47

## 2019-08-25 RX ADMIN — Medication 650 MILLIGRAM(S): at 18:02

## 2019-08-25 RX ADMIN — Medication 100 MILLIGRAM(S): at 05:16

## 2019-08-25 RX ADMIN — Medication 650 MILLIGRAM(S): at 23:49

## 2019-08-25 RX ADMIN — OXYCODONE HYDROCHLORIDE 5 MILLIGRAM(S): 5 TABLET ORAL at 09:53

## 2019-08-25 RX ADMIN — Medication 100 MILLIGRAM(S): at 12:47

## 2019-08-25 RX ADMIN — Medication 650 MILLIGRAM(S): at 22:15

## 2019-08-25 RX ADMIN — Medication 325 MILLIGRAM(S): at 12:47

## 2019-08-25 RX ADMIN — PREGABALIN 1000 MICROGRAM(S): 225 CAPSULE ORAL at 12:47

## 2019-08-25 RX ADMIN — Medication 1 TABLET(S): at 17:36

## 2019-08-25 RX ADMIN — Medication 650 MILLIGRAM(S): at 12:47

## 2019-08-26 LAB
ANION GAP SERPL CALC-SCNC: 15 MMOL/L — HIGH (ref 7–14)
BUN SERPL-MCNC: 21 MG/DL — HIGH (ref 10–20)
CALCIUM SERPL-MCNC: 9.6 MG/DL — SIGNIFICANT CHANGE UP (ref 8.5–10.1)
CHLORIDE SERPL-SCNC: 100 MMOL/L — SIGNIFICANT CHANGE UP (ref 98–110)
CO2 SERPL-SCNC: 25 MMOL/L — SIGNIFICANT CHANGE UP (ref 17–32)
CREAT SERPL-MCNC: 0.7 MG/DL — SIGNIFICANT CHANGE UP (ref 0.7–1.5)
GLUCOSE SERPL-MCNC: 133 MG/DL — HIGH (ref 70–99)
HCT VFR BLD CALC: 31.5 % — LOW (ref 37–47)
HGB BLD-MCNC: 9.9 G/DL — LOW (ref 12–16)
MCHC RBC-ENTMCNC: 29.5 PG — SIGNIFICANT CHANGE UP (ref 27–31)
MCHC RBC-ENTMCNC: 31.4 G/DL — LOW (ref 32–37)
MCV RBC AUTO: 93.8 FL — SIGNIFICANT CHANGE UP (ref 81–99)
NRBC # BLD: 0 /100 WBCS — SIGNIFICANT CHANGE UP (ref 0–0)
PLATELET # BLD AUTO: 552 K/UL — HIGH (ref 130–400)
POTASSIUM SERPL-MCNC: 5.2 MMOL/L — HIGH (ref 3.5–5)
POTASSIUM SERPL-SCNC: 5.2 MMOL/L — HIGH (ref 3.5–5)
RBC # BLD: 3.36 M/UL — LOW (ref 4.2–5.4)
RBC # FLD: 14.8 % — HIGH (ref 11.5–14.5)
SODIUM SERPL-SCNC: 140 MMOL/L — SIGNIFICANT CHANGE UP (ref 135–146)
WBC # BLD: 11.77 K/UL — HIGH (ref 4.8–10.8)
WBC # FLD AUTO: 11.77 K/UL — HIGH (ref 4.8–10.8)

## 2019-08-26 PROCEDURE — 99222 1ST HOSP IP/OBS MODERATE 55: CPT

## 2019-08-26 PROCEDURE — 93970 EXTREMITY STUDY: CPT | Mod: 26

## 2019-08-26 RX ORDER — ENOXAPARIN SODIUM 100 MG/ML
70 INJECTION SUBCUTANEOUS EVERY 12 HOURS
Refills: 0 | Status: DISCONTINUED | OUTPATIENT
Start: 2019-08-26 | End: 2019-08-27

## 2019-08-26 RX ADMIN — Medication 25 MILLIGRAM(S): at 05:02

## 2019-08-26 RX ADMIN — OXYCODONE HYDROCHLORIDE 5 MILLIGRAM(S): 5 TABLET ORAL at 08:20

## 2019-08-26 RX ADMIN — ENOXAPARIN SODIUM 40 MILLIGRAM(S): 100 INJECTION SUBCUTANEOUS at 11:30

## 2019-08-26 RX ADMIN — Medication 650 MILLIGRAM(S): at 17:59

## 2019-08-26 RX ADMIN — Medication 100 MILLIGRAM(S): at 13:14

## 2019-08-26 RX ADMIN — TRAMADOL HYDROCHLORIDE 50 MILLIGRAM(S): 50 TABLET ORAL at 22:23

## 2019-08-26 RX ADMIN — PREGABALIN 1000 MICROGRAM(S): 225 CAPSULE ORAL at 11:30

## 2019-08-26 RX ADMIN — Medication 650 MILLIGRAM(S): at 11:30

## 2019-08-26 RX ADMIN — Medication 650 MILLIGRAM(S): at 13:13

## 2019-08-26 RX ADMIN — TRAMADOL HYDROCHLORIDE 50 MILLIGRAM(S): 50 TABLET ORAL at 23:18

## 2019-08-26 RX ADMIN — ATORVASTATIN CALCIUM 40 MILLIGRAM(S): 80 TABLET, FILM COATED ORAL at 21:33

## 2019-08-26 RX ADMIN — Medication 81 MILLIGRAM(S): at 11:30

## 2019-08-26 RX ADMIN — Medication 1 TABLET(S): at 05:02

## 2019-08-26 RX ADMIN — OXYCODONE HYDROCHLORIDE 5 MILLIGRAM(S): 5 TABLET ORAL at 08:53

## 2019-08-26 RX ADMIN — Medication 650 MILLIGRAM(S): at 05:02

## 2019-08-26 RX ADMIN — Medication 1000 UNIT(S): at 11:30

## 2019-08-26 RX ADMIN — Medication 650 MILLIGRAM(S): at 19:13

## 2019-08-26 RX ADMIN — LISINOPRIL 40 MILLIGRAM(S): 2.5 TABLET ORAL at 05:03

## 2019-08-26 RX ADMIN — Medication 100 MILLIGRAM(S): at 21:33

## 2019-08-26 RX ADMIN — Medication 1 TABLET(S): at 17:59

## 2019-08-26 RX ADMIN — Medication 650 MILLIGRAM(S): at 06:10

## 2019-08-26 RX ADMIN — Medication 100 MILLIGRAM(S): at 05:02

## 2019-08-26 RX ADMIN — Medication 325 MILLIGRAM(S): at 11:29

## 2019-08-26 RX ADMIN — Medication 1 MILLIGRAM(S): at 11:30

## 2019-08-26 NOTE — PHARMACOTHERAPY INTERVENTION NOTE - COMMENTS
Spoke with attending, [id number], recommended dose to be 1mg/kg. Nurse stated that patient's weight is 93kg. however dose written was 70mg q12h. MD stated that he is ok with dose

## 2019-08-27 RX ORDER — ENOXAPARIN SODIUM 100 MG/ML
50 INJECTION SUBCUTANEOUS
Refills: 0 | Status: DISCONTINUED | OUTPATIENT
Start: 2019-08-27 | End: 2019-08-27

## 2019-08-27 RX ORDER — APIXABAN 2.5 MG/1
10 TABLET, FILM COATED ORAL EVERY 12 HOURS
Refills: 0 | Status: DISCONTINUED | OUTPATIENT
Start: 2019-08-27 | End: 2019-08-29

## 2019-08-27 RX ADMIN — Medication 100 MILLIGRAM(S): at 21:47

## 2019-08-27 RX ADMIN — Medication 81 MILLIGRAM(S): at 12:24

## 2019-08-27 RX ADMIN — LISINOPRIL 40 MILLIGRAM(S): 2.5 TABLET ORAL at 06:43

## 2019-08-27 RX ADMIN — Medication 1000 UNIT(S): at 12:24

## 2019-08-27 RX ADMIN — Medication 650 MILLIGRAM(S): at 18:17

## 2019-08-27 RX ADMIN — Medication 1 MILLIGRAM(S): at 12:24

## 2019-08-27 RX ADMIN — Medication 1 TABLET(S): at 06:43

## 2019-08-27 RX ADMIN — ENOXAPARIN SODIUM 70 MILLIGRAM(S): 100 INJECTION SUBCUTANEOUS at 06:43

## 2019-08-27 RX ADMIN — TRAMADOL HYDROCHLORIDE 50 MILLIGRAM(S): 50 TABLET ORAL at 21:51

## 2019-08-27 RX ADMIN — PREGABALIN 1000 MICROGRAM(S): 225 CAPSULE ORAL at 12:25

## 2019-08-27 RX ADMIN — ATORVASTATIN CALCIUM 40 MILLIGRAM(S): 80 TABLET, FILM COATED ORAL at 21:46

## 2019-08-27 RX ADMIN — Medication 650 MILLIGRAM(S): at 12:24

## 2019-08-27 RX ADMIN — Medication 650 MILLIGRAM(S): at 06:42

## 2019-08-27 RX ADMIN — Medication 650 MILLIGRAM(S): at 13:11

## 2019-08-27 RX ADMIN — Medication 650 MILLIGRAM(S): at 07:20

## 2019-08-27 RX ADMIN — APIXABAN 10 MILLIGRAM(S): 2.5 TABLET, FILM COATED ORAL at 21:47

## 2019-08-27 RX ADMIN — Medication 1 TABLET(S): at 18:17

## 2019-08-27 RX ADMIN — Medication 650 MILLIGRAM(S): at 19:33

## 2019-08-27 RX ADMIN — Medication 25 MILLIGRAM(S): at 06:43

## 2019-08-27 RX ADMIN — Medication 325 MILLIGRAM(S): at 12:24

## 2019-08-27 RX ADMIN — Medication 100 MILLIGRAM(S): at 06:43

## 2019-08-27 RX ADMIN — Medication 100 MILLIGRAM(S): at 12:25

## 2019-08-27 RX ADMIN — OXYCODONE HYDROCHLORIDE 5 MILLIGRAM(S): 5 TABLET ORAL at 08:31

## 2019-08-28 ENCOUNTER — TRANSCRIPTION ENCOUNTER (OUTPATIENT)
Age: 79
End: 2019-08-28

## 2019-08-28 RX ORDER — PREGABALIN 225 MG/1
1 CAPSULE ORAL
Qty: 0 | Refills: 0 | DISCHARGE
Start: 2019-08-28

## 2019-08-28 RX ORDER — CHOLECALCIFEROL (VITAMIN D3) 125 MCG
1000 CAPSULE ORAL
Qty: 0 | Refills: 0 | DISCHARGE
Start: 2019-08-28

## 2019-08-28 RX ORDER — ASPIRIN/CALCIUM CARB/MAGNESIUM 324 MG
1 TABLET ORAL
Qty: 0 | Refills: 0 | DISCHARGE
Start: 2019-08-28

## 2019-08-28 RX ORDER — FERROUS SULFATE 325(65) MG
1 TABLET ORAL
Qty: 30 | Refills: 0
Start: 2019-08-28

## 2019-08-28 RX ORDER — ACETAMINOPHEN 500 MG
2 TABLET ORAL
Qty: 0 | Refills: 0 | DISCHARGE
Start: 2019-08-28

## 2019-08-28 RX ORDER — ASPIRIN/CALCIUM CARB/MAGNESIUM 324 MG
1 TABLET ORAL
Qty: 0 | Refills: 0 | DISCHARGE

## 2019-08-28 RX ORDER — PREGABALIN 225 MG/1
1 CAPSULE ORAL
Qty: 0 | Refills: 0 | DISCHARGE

## 2019-08-28 RX ORDER — OXYCODONE HYDROCHLORIDE 5 MG/1
1 TABLET ORAL
Qty: 0 | Refills: 0 | DISCHARGE
Start: 2019-08-28

## 2019-08-28 RX ORDER — LISINOPRIL 2.5 MG/1
1 TABLET ORAL
Qty: 0 | Refills: 0 | DISCHARGE
Start: 2019-08-28

## 2019-08-28 RX ORDER — TRAMADOL HYDROCHLORIDE 50 MG/1
1 TABLET ORAL
Qty: 0 | Refills: 0 | DISCHARGE
Start: 2019-08-28

## 2019-08-28 RX ORDER — APIXABAN 2.5 MG/1
2 TABLET, FILM COATED ORAL
Qty: 0 | Refills: 0 | DISCHARGE
Start: 2019-08-28

## 2019-08-28 RX ORDER — FOLIC ACID 0.8 MG
1 TABLET ORAL
Qty: 0 | Refills: 0 | DISCHARGE
Start: 2019-08-28

## 2019-08-28 RX ORDER — LISINOPRIL 2.5 MG/1
1 TABLET ORAL
Qty: 0 | Refills: 0 | DISCHARGE

## 2019-08-28 RX ORDER — CHOLECALCIFEROL (VITAMIN D3) 125 MCG
1 CAPSULE ORAL
Qty: 0 | Refills: 0 | DISCHARGE

## 2019-08-28 RX ADMIN — Medication 81 MILLIGRAM(S): at 12:06

## 2019-08-28 RX ADMIN — APIXABAN 10 MILLIGRAM(S): 2.5 TABLET, FILM COATED ORAL at 06:22

## 2019-08-28 RX ADMIN — PREGABALIN 1000 MICROGRAM(S): 225 CAPSULE ORAL at 12:06

## 2019-08-28 RX ADMIN — ATORVASTATIN CALCIUM 40 MILLIGRAM(S): 80 TABLET, FILM COATED ORAL at 21:12

## 2019-08-28 RX ADMIN — OXYCODONE HYDROCHLORIDE 5 MILLIGRAM(S): 5 TABLET ORAL at 08:33

## 2019-08-28 RX ADMIN — Medication 1000 UNIT(S): at 12:06

## 2019-08-28 RX ADMIN — Medication 100 MILLIGRAM(S): at 06:22

## 2019-08-28 RX ADMIN — OXYCODONE HYDROCHLORIDE 5 MILLIGRAM(S): 5 TABLET ORAL at 09:32

## 2019-08-28 RX ADMIN — APIXABAN 10 MILLIGRAM(S): 2.5 TABLET, FILM COATED ORAL at 18:06

## 2019-08-28 RX ADMIN — Medication 650 MILLIGRAM(S): at 13:31

## 2019-08-28 RX ADMIN — Medication 325 MILLIGRAM(S): at 12:06

## 2019-08-28 RX ADMIN — Medication 650 MILLIGRAM(S): at 06:22

## 2019-08-28 RX ADMIN — Medication 1 TABLET(S): at 06:22

## 2019-08-28 RX ADMIN — Medication 1 MILLIGRAM(S): at 12:06

## 2019-08-28 RX ADMIN — LISINOPRIL 40 MILLIGRAM(S): 2.5 TABLET ORAL at 06:22

## 2019-08-28 RX ADMIN — Medication 1 TABLET(S): at 18:06

## 2019-08-28 RX ADMIN — Medication 650 MILLIGRAM(S): at 18:06

## 2019-08-28 RX ADMIN — Medication 25 MILLIGRAM(S): at 06:22

## 2019-08-28 RX ADMIN — TRAMADOL HYDROCHLORIDE 50 MILLIGRAM(S): 50 TABLET ORAL at 00:19

## 2019-08-28 RX ADMIN — Medication 100 MILLIGRAM(S): at 12:06

## 2019-08-28 RX ADMIN — Medication 100 MILLIGRAM(S): at 21:12

## 2019-08-28 RX ADMIN — Medication 650 MILLIGRAM(S): at 18:49

## 2019-08-28 RX ADMIN — TRAMADOL HYDROCHLORIDE 50 MILLIGRAM(S): 50 TABLET ORAL at 23:02

## 2019-08-28 RX ADMIN — Medication 650 MILLIGRAM(S): at 12:06

## 2019-08-28 NOTE — DISCHARGE NOTE PROVIDER - NSDCFUSCHEDAPPT_GEN_ALL_CORE_FT
NOEL QUEZADA ; 10/01/2019 ; [facility] [address] NOEL Cordova ; 10/08/2019 ; [facility] [address]

## 2019-08-28 NOTE — DISCHARGE NOTE PROVIDER - CARE PROVIDER_API CALL
Howard Segovia)  Orthopaedic Surgery  [address] [place] [address]  Phone: [phone]  Fax: [phone]  Follow Up Time:     Nishant Wetzel)  Vascular Surgery  [address] [place] [address]  Phone: [phone]  Fax: [phone]  Follow Up Time:

## 2019-08-28 NOTE — DISCHARGE NOTE PROVIDER - HOSPITAL COURSE
79F with PMH HTN, HLD, and PSH of right hip replacement presents s/p fall fall with right thigh pain, +ve HT on ASA with posterior laceration, negative LOC, Patient states she was kneeling on floor doing and leaned backwards and lost her balance hitting head on coffee table. Patient denies any dizziness, N/V or visual complaints. Patient states she dragged her body to phone because she was unable to ambulate due to pain at hip. No CP, SOB, palpitations, neck, back or abdominal pain. No focal weakness or paresthesias.  ptn is  sp  rt femur fx  orif  8/9/19.        Prior functional status: Independent without AD         Admission Physical Exam:    Vital signs stable. Afebrile    Gen: alert, NAD, positive left temple actinic keratosis    Cardio: RRR    Lungs: clear    Abd: soft, NT    Extremities: without edema. PROM wnl. + varicosities in B/L extremities. + Right hop dressing.     Cranial nerves: CN II-XII grossly intact    Motor Power: RUE/LUE: 4/5. Decreased motion in right hip 2/2 pain. R foot: 4/5      Sensation: intact        Hospital Course: The patient was admitted to the acute inpatient rehab unit presenting with a decline in functional status. The patient participated in three hours of multidisciplinary therapy 5-6 days per week. The patient was continued on all home medications or equivalent alternatives as deemed appropriate. The patient received prophylactic anticoagulation medication and was monitored closely with no complications. During the stay on the inpatient unit, the patient showed as much progress as had been anticipated and was cleared for discharge by a multidisciplinary team.    Inpatient Rehabilitation Daily:     8/13: Patient admitted to acute in-patient rehabilitation     8/14: Admission labs     8/15: Admission labs WNL     8/19: Mild leukocytosis - patient is afebrile and normotensive     8/20: Follow up leukocytosis in AM    8/21: Leukocytosis trending down    8/26: Lower extremity doppler performed    - Patient found to have     -Acute right posterior tibial vein DVT.     -Acute left peroneal vein DVT.    -Bilateral gastrocnemius vein thrombosis.    8/27: Vascular consulted    - Recommends Eliquis x 3 months         Discharge functional status:    Physical Therapy:     - Patient ambulated 2 steps in parallel bars under max assist     - Bed mobility: Moderate Assistance    --- Sit to supine    --- Roll to right/left    --- Stand pivot     --- Sit pivot         Occupational Therapy:    - Eating: Independent    - UB dressing: Setup with pullover garment    - LB dressing: Min A w/ hiking pants to waist and donning    - Grooming: Supervision setup     - Bed-W/c: transfers x 2 max assist         Discharge Disposition: SNF

## 2019-08-28 NOTE — DISCHARGE NOTE PROVIDER - NSDCCPCAREPLAN_GEN_ALL_CORE_FT
PRINCIPAL DISCHARGE DIAGNOSIS  Diagnosis: Closed traumatic fracture of right hip with routine healing  Assessment and Plan of Treatment: - NWB to RLE   - Follow up with Dr. Lopez  - Continue PT/OT in SNF   - Pain control as needed  - Right hip staples removed      SECONDARY DISCHARGE DIAGNOSES  Diagnosis: DVT, bilateral lower limbs  Assessment and Plan of Treatment: - Continue Eliquis as prescribed   - Follow up with Dr. Holloway

## 2019-08-28 NOTE — DISCHARGE NOTE PROVIDER - CARE PROVIDERS DIRECT ADDRESSES
[email or web address]

## 2019-08-29 ENCOUNTER — TRANSCRIPTION ENCOUNTER (OUTPATIENT)
Age: 79
End: 2019-08-29

## 2019-08-29 RX ADMIN — Medication 650 MILLIGRAM(S): at 08:13

## 2019-08-29 RX ADMIN — Medication 325 MILLIGRAM(S): at 11:40

## 2019-08-29 RX ADMIN — Medication 1000 UNIT(S): at 11:40

## 2019-08-29 RX ADMIN — Medication 650 MILLIGRAM(S): at 13:35

## 2019-08-29 RX ADMIN — OXYCODONE HYDROCHLORIDE 5 MILLIGRAM(S): 5 TABLET ORAL at 13:34

## 2019-08-29 RX ADMIN — Medication 25 MILLIGRAM(S): at 06:16

## 2019-08-29 RX ADMIN — PREGABALIN 1000 MICROGRAM(S): 225 CAPSULE ORAL at 11:41

## 2019-08-29 RX ADMIN — APIXABAN 10 MILLIGRAM(S): 2.5 TABLET, FILM COATED ORAL at 06:16

## 2019-08-29 RX ADMIN — OXYCODONE HYDROCHLORIDE 5 MILLIGRAM(S): 5 TABLET ORAL at 12:28

## 2019-08-29 RX ADMIN — Medication 1 MILLIGRAM(S): at 11:40

## 2019-08-29 RX ADMIN — Medication 650 MILLIGRAM(S): at 06:16

## 2019-08-29 RX ADMIN — Medication 650 MILLIGRAM(S): at 11:40

## 2019-08-29 RX ADMIN — Medication 1 TABLET(S): at 06:16

## 2019-08-29 RX ADMIN — Medication 81 MILLIGRAM(S): at 11:40

## 2019-08-29 RX ADMIN — LISINOPRIL 40 MILLIGRAM(S): 2.5 TABLET ORAL at 06:15

## 2019-08-29 RX ADMIN — Medication 100 MILLIGRAM(S): at 06:16

## 2019-08-29 NOTE — DISCHARGE NOTE NURSING/CASE MANAGEMENT/SOCIAL WORK - PATIENT PORTAL LINK FT
You can access the FollowMyHealth Patient Portal offered by [facility] by registering at the following website: [email or web address]. By joining [facility]’s FollowMyHealth portal, you will also be able to view your health information using other applications (apps) compatible with our system.

## 2019-08-30 ENCOUNTER — OUTPATIENT (OUTPATIENT)
Dept: OUTPATIENT SERVICES | Facility: HOSPITAL | Age: 79
LOS: 1 days | Discharge: HOME | End: 2019-08-30

## 2019-08-30 ENCOUNTER — INBOUND DOCUMENT (OUTPATIENT)
Age: 79
End: 2019-08-30

## 2019-08-30 DIAGNOSIS — Z98.890 OTHER SPECIFIED POSTPROCEDURAL STATES: Chronic | ICD-10-CM

## 2019-08-30 DIAGNOSIS — R79.9 ABNORMAL FINDING OF BLOOD CHEMISTRY, UNSPECIFIED: ICD-10-CM

## 2019-09-06 DIAGNOSIS — I82.492 ACUTE EMBOLISM AND THROMBOSIS OF OTHER SPECIFIED DEEP VEIN OF LEFT LOWER EXTREMITY: ICD-10-CM

## 2019-09-06 DIAGNOSIS — I82.441 ACUTE EMBOLISM AND THROMBOSIS OF RIGHT TIBIAL VEIN: ICD-10-CM

## 2019-09-06 DIAGNOSIS — I10 ESSENTIAL (PRIMARY) HYPERTENSION: ICD-10-CM
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
## 2019-09-06 DIAGNOSIS — M97.01XD PERIPROSTHETIC FRACTURE AROUND INTERNAL PROSTHETIC RIGHT HIP JOINT, SUBSEQUENT ENCOUNTER: ICD-10-CM

## 2019-09-06 DIAGNOSIS — E66.9 OBESITY, UNSPECIFIED: ICD-10-CM

## 2019-09-06 DIAGNOSIS — Z87.891 PERSONAL HISTORY OF NICOTINE DEPENDENCE: ICD-10-CM

## 2019-09-06 DIAGNOSIS — R53.81 OTHER MALAISE: ICD-10-CM

## 2019-09-06 DIAGNOSIS — E78.5 HYPERLIPIDEMIA, UNSPECIFIED: ICD-10-CM

## 2019-09-06 DIAGNOSIS — D64.9 ANEMIA, UNSPECIFIED: ICD-10-CM

## 2019-09-06 DIAGNOSIS — M81.0 AGE-RELATED OSTEOPOROSIS WITHOUT CURRENT PATHOLOGICAL FRACTURE: ICD-10-CM

## 2019-09-06 DIAGNOSIS — Z96.641 PRESENCE OF RIGHT ARTIFICIAL HIP JOINT: ICD-10-CM

## 2019-09-06 DIAGNOSIS — I82.491 ACUTE EMBOLISM AND THROMBOSIS OF OTHER SPECIFIED DEEP VEIN OF RIGHT LOWER EXTREMITY: ICD-10-CM

## 2019-09-10 ENCOUNTER — APPOINTMENT (OUTPATIENT)
Dept: VASCULAR SURGERY | Facility: CLINIC | Age: 79
End: 2019-09-10

## 2019-09-26 ENCOUNTER — OUTPATIENT (OUTPATIENT)
Dept: OUTPATIENT SERVICES | Facility: HOSPITAL | Age: 79
LOS: 1 days | Discharge: HOME | End: 2019-09-26

## 2019-09-26 DIAGNOSIS — M06.9 RHEUMATOID ARTHRITIS, UNSPECIFIED: ICD-10-CM

## 2019-09-26 DIAGNOSIS — Z98.890 OTHER SPECIFIED POSTPROCEDURAL STATES: Chronic | ICD-10-CM

## 2019-09-28 ENCOUNTER — OUTPATIENT (OUTPATIENT)
Dept: OUTPATIENT SERVICES | Facility: HOSPITAL | Age: 79
LOS: 1 days | Discharge: HOME | End: 2019-09-28

## 2019-09-28 DIAGNOSIS — N39.0 URINARY TRACT INFECTION, SITE NOT SPECIFIED: ICD-10-CM

## 2019-09-28 DIAGNOSIS — Z98.890 OTHER SPECIFIED POSTPROCEDURAL STATES: Chronic | ICD-10-CM

## 2019-09-30 ENCOUNTER — OUTPATIENT (OUTPATIENT)
Dept: OUTPATIENT SERVICES | Facility: HOSPITAL | Age: 79
LOS: 1 days | Discharge: HOME | End: 2019-09-30

## 2019-09-30 DIAGNOSIS — Z98.890 OTHER SPECIFIED POSTPROCEDURAL STATES: Chronic | ICD-10-CM

## 2019-09-30 DIAGNOSIS — R79.9 ABNORMAL FINDING OF BLOOD CHEMISTRY, UNSPECIFIED: ICD-10-CM

## 2019-10-01 ENCOUNTER — APPOINTMENT (OUTPATIENT)
Dept: CARDIOLOGY | Facility: CLINIC | Age: 79
End: 2019-10-01

## 2019-10-08 ENCOUNTER — APPOINTMENT (OUTPATIENT)
Dept: CARDIOLOGY | Facility: CLINIC | Age: 79
End: 2019-10-08

## 2019-10-20 ENCOUNTER — OUTPATIENT (OUTPATIENT)
Dept: OUTPATIENT SERVICES | Facility: HOSPITAL | Age: 79
LOS: 1 days | Discharge: HOME | End: 2019-10-20

## 2019-10-20 DIAGNOSIS — N39.0 URINARY TRACT INFECTION, SITE NOT SPECIFIED: ICD-10-CM

## 2019-10-20 DIAGNOSIS — Z98.890 OTHER SPECIFIED POSTPROCEDURAL STATES: Chronic | ICD-10-CM

## 2019-10-22 ENCOUNTER — OUTPATIENT (OUTPATIENT)
Dept: OUTPATIENT SERVICES | Facility: HOSPITAL | Age: 79
LOS: 1 days | Discharge: HOME | End: 2019-10-22

## 2019-10-22 DIAGNOSIS — Z98.890 OTHER SPECIFIED POSTPROCEDURAL STATES: Chronic | ICD-10-CM

## 2019-10-22 DIAGNOSIS — R79.9 ABNORMAL FINDING OF BLOOD CHEMISTRY, UNSPECIFIED: ICD-10-CM

## 2019-10-30 ENCOUNTER — OUTPATIENT (OUTPATIENT)
Dept: OUTPATIENT SERVICES | Facility: HOSPITAL | Age: 79
LOS: 1 days | Discharge: HOME | End: 2019-10-30

## 2019-10-30 DIAGNOSIS — R50.9 FEVER, UNSPECIFIED: ICD-10-CM

## 2019-10-30 DIAGNOSIS — Z98.890 OTHER SPECIFIED POSTPROCEDURAL STATES: Chronic | ICD-10-CM

## 2019-10-30 DIAGNOSIS — N39.0 URINARY TRACT INFECTION, SITE NOT SPECIFIED: ICD-10-CM

## 2019-11-11 ENCOUNTER — OUTPATIENT (OUTPATIENT)
Dept: OUTPATIENT SERVICES | Facility: HOSPITAL | Age: 79
LOS: 1 days | Discharge: HOME | End: 2019-11-11

## 2019-11-11 DIAGNOSIS — R30.0 DYSURIA: ICD-10-CM

## 2019-11-11 DIAGNOSIS — Z98.890 OTHER SPECIFIED POSTPROCEDURAL STATES: Chronic | ICD-10-CM

## 2019-11-12 ENCOUNTER — OUTPATIENT (OUTPATIENT)
Dept: OUTPATIENT SERVICES | Facility: HOSPITAL | Age: 79
LOS: 1 days | Discharge: HOME | End: 2019-11-12

## 2019-11-12 DIAGNOSIS — R30.0 DYSURIA: ICD-10-CM

## 2019-11-12 DIAGNOSIS — Z98.890 OTHER SPECIFIED POSTPROCEDURAL STATES: Chronic | ICD-10-CM

## 2020-02-20 ENCOUNTER — APPOINTMENT (OUTPATIENT)
Dept: CARDIOLOGY | Facility: CLINIC | Age: 80
End: 2020-02-20
Payer: MEDICARE

## 2020-02-20 PROCEDURE — 93306 TTE W/DOPPLER COMPLETE: CPT

## 2020-09-23 ENCOUNTER — RECORD ABSTRACTING (OUTPATIENT)
Age: 80
End: 2020-09-23

## 2020-09-23 DIAGNOSIS — R06.00 DYSPNEA, UNSPECIFIED: ICD-10-CM

## 2020-09-23 DIAGNOSIS — I10 ESSENTIAL (PRIMARY) HYPERTENSION: ICD-10-CM

## 2020-09-23 DIAGNOSIS — Z78.9 OTHER SPECIFIED HEALTH STATUS: ICD-10-CM

## 2020-09-23 DIAGNOSIS — I65.23 OCCLUSION AND STENOSIS OF BILATERAL CAROTID ARTERIES: ICD-10-CM

## 2020-09-23 DIAGNOSIS — I35.1 NONRHEUMATIC AORTIC (VALVE) INSUFFICIENCY: ICD-10-CM

## 2020-09-23 DIAGNOSIS — Z86.39 PERSONAL HISTORY OF OTHER ENDOCRINE, NUTRITIONAL AND METABOLIC DISEASE: ICD-10-CM

## 2020-09-23 DIAGNOSIS — I34.9 NONRHEUMATIC MITRAL VALVE DISORDER, UNSPECIFIED: ICD-10-CM

## 2020-09-23 DIAGNOSIS — I36.0 NONRHEUMATIC TRICUSPID (VALVE) STENOSIS: ICD-10-CM

## 2020-09-23 DIAGNOSIS — Z86.79 PERSONAL HISTORY OF OTHER DISEASES OF THE CIRCULATORY SYSTEM: ICD-10-CM

## 2020-09-23 RX ORDER — HYDROCHLOROTHIAZIDE 25 MG/1
25 TABLET ORAL DAILY
Refills: 0 | Status: ACTIVE | COMMUNITY

## 2020-09-23 RX ORDER — PNV NO.95/FERROUS FUM/FOLIC AC 28MG-0.8MG
100 TABLET ORAL
Refills: 0 | Status: ACTIVE | COMMUNITY

## 2020-09-23 RX ORDER — ROSUVASTATIN CALCIUM 10 MG/1
10 TABLET, FILM COATED ORAL
Qty: 90 | Refills: 0 | Status: ACTIVE | COMMUNITY

## 2020-09-23 RX ORDER — PSYLLIUM HUSK 0.4 G
CAPSULE ORAL
Refills: 0 | Status: ACTIVE | COMMUNITY

## 2020-09-23 RX ORDER — ERGOCALCIFEROL (VITAMIN D2) 1250 MCG
50000 CAPSULE ORAL
Refills: 0 | Status: ACTIVE | COMMUNITY

## 2020-11-03 ENCOUNTER — APPOINTMENT (OUTPATIENT)
Dept: CARDIOLOGY | Facility: CLINIC | Age: 80
End: 2020-11-03
Payer: MEDICARE

## 2020-11-03 VITALS
DIASTOLIC BLOOD PRESSURE: 100 MMHG | BODY MASS INDEX: 31.99 KG/M2 | WEIGHT: 192 LBS | HEIGHT: 65 IN | HEART RATE: 72 BPM | SYSTOLIC BLOOD PRESSURE: 180 MMHG

## 2020-11-03 PROCEDURE — 99214 OFFICE O/P EST MOD 30 MIN: CPT

## 2020-11-03 PROCEDURE — 93000 ELECTROCARDIOGRAM COMPLETE: CPT

## 2020-11-03 RX ORDER — ASPIRIN 81 MG
81 TABLET, DELAYED RELEASE (ENTERIC COATED) ORAL DAILY
Refills: 0 | Status: COMPLETED | COMMUNITY
End: 2020-11-03

## 2020-11-24 NOTE — PATIENT PROFILE ADULT - NSASFUNCLEVELADLTOILET_GEN_A_NUR
[1 = assistive equipment

## 2021-03-09 ENCOUNTER — APPOINTMENT (OUTPATIENT)
Dept: CARDIOLOGY | Facility: CLINIC | Age: 81
End: 2021-03-09
Payer: MEDICARE

## 2021-03-09 VITALS
DIASTOLIC BLOOD PRESSURE: 90 MMHG | HEIGHT: 65 IN | TEMPERATURE: 98 F | SYSTOLIC BLOOD PRESSURE: 140 MMHG | BODY MASS INDEX: 31.82 KG/M2 | WEIGHT: 191 LBS | HEART RATE: 83 BPM

## 2021-03-09 PROCEDURE — 93000 ELECTROCARDIOGRAM COMPLETE: CPT

## 2021-03-09 PROCEDURE — 99213 OFFICE O/P EST LOW 20 MIN: CPT

## 2021-09-15 ENCOUNTER — APPOINTMENT (OUTPATIENT)
Dept: CARDIOLOGY | Facility: CLINIC | Age: 81
End: 2021-09-15
Payer: MEDICARE

## 2021-09-15 PROCEDURE — 93306 TTE W/DOPPLER COMPLETE: CPT

## 2021-09-28 ENCOUNTER — APPOINTMENT (OUTPATIENT)
Dept: CARDIOLOGY | Facility: CLINIC | Age: 81
End: 2021-09-28

## 2021-11-04 NOTE — PHYSICAL THERAPY INITIAL EVALUATION ADULT - LEVEL OF INDEPENDENCE: SIT/STAND, REHAB EVAL
Discharge Notification    Patient Name: Demetrice Schaffer  Tristan Mora MA O  Subscriber #: [id number]  Authorization Number: [id number]  Admit Date/Time: 10/26/2021 8:44 AM  Discharge Date/Time: 11/2/2021 1:04 PM unable to perform

## 2022-02-15 RX ORDER — METOPROLOL SUCCINATE 25 MG/1
25 TABLET, EXTENDED RELEASE ORAL DAILY
Qty: 90 | Refills: 3 | Status: ACTIVE | COMMUNITY
Start: 1900-01-01 | End: 1900-01-01

## 2022-03-16 ENCOUNTER — APPOINTMENT (OUTPATIENT)
Dept: CARDIOLOGY | Facility: CLINIC | Age: 82
End: 2022-03-16
Payer: MEDICARE

## 2022-03-16 VITALS — SYSTOLIC BLOOD PRESSURE: 124 MMHG | DIASTOLIC BLOOD PRESSURE: 80 MMHG

## 2022-03-16 PROCEDURE — 99213 OFFICE O/P EST LOW 20 MIN: CPT

## 2022-03-16 PROCEDURE — 93000 ELECTROCARDIOGRAM COMPLETE: CPT

## 2022-03-16 RX ORDER — LISINOPRIL 40 MG/1
40 TABLET ORAL TWICE DAILY
Refills: 0 | Status: ACTIVE | COMMUNITY

## 2022-03-16 RX ORDER — AMLODIPINE BESYLATE 5 MG/1
5 TABLET ORAL DAILY
Refills: 0 | Status: ACTIVE | COMMUNITY

## 2022-03-16 RX ORDER — ENALAPRIL MALEATE 20 MG/1
20 TABLET ORAL TWICE DAILY
Refills: 0 | Status: DISCONTINUED | COMMUNITY
End: 2022-03-16

## 2022-10-05 ENCOUNTER — APPOINTMENT (OUTPATIENT)
Dept: CARDIOLOGY | Facility: CLINIC | Age: 82
End: 2022-10-05

## 2022-11-28 ENCOUNTER — APPOINTMENT (OUTPATIENT)
Dept: CARDIOLOGY | Facility: CLINIC | Age: 82
End: 2022-11-28

## 2023-01-01 NOTE — INPATIENT CERTIFICATION FOR MEDICARE PATIENTS - PHYSICIAN CONCUR
I concur with the Admission Order and I certify that services are provided in accordance with Section 42 CFR § 412.3
9

## 2023-05-03 NOTE — DISCHARGE NOTE PROVIDER - NSDCCAREPROVSEEN_GEN_ALL_CORE_FT
Nury Frazier Double O-Z Plasty Text: The defect edges were debeveled with a #15 scalpel blade. Given the location of the defect, shape of the defect and the proximity to free margins a Double O-Z plasty (double transposition flap) was deemed most appropriate. Using a sterile surgical marker, the appropriate transposition flaps were drawn incorporating the defect and placing the expected incisions within the relaxed skin tension lines where possible. The area thus outlined was incised deep to adipose tissue with a #15 scalpel blade. The skin margins were undermined to an appropriate distance in all directions utilizing iris scissors. Hemostasis was achieved with electrocautery. The flaps were then transposed and carried over into place, one clockwise and the other counterclockwise, and anchored with interrupted buried subcutaneous sutures.

## 2023-08-28 NOTE — ED ADULT NURSE NOTE - NSFALLRSKINDICTYPE_ED_ALL_ED
Detail Level: Detailed Quality 130: Documentation Of Current Medications In The Medical Record: Current Medications Documented Quality 431: Preventive Care And Screening: Unhealthy Alcohol Use - Screening: Patient not identified as an unhealthy alcohol user when screened for unhealthy alcohol use using a systematic screening method Quality 226: Preventive Care And Screening: Tobacco Use: Screening And Cessation Intervention: Patient screened for tobacco use and is an ex/non-smoker Need for Mobility Assisted Device
